# Patient Record
Sex: FEMALE | Race: BLACK OR AFRICAN AMERICAN | URBAN - METROPOLITAN AREA
[De-identification: names, ages, dates, MRNs, and addresses within clinical notes are randomized per-mention and may not be internally consistent; named-entity substitution may affect disease eponyms.]

---

## 2023-03-08 PROBLEM — E55.9 VITAMIN D DEFICIENCY: Status: ACTIVE | Noted: 2023-03-08

## 2023-03-08 PROBLEM — E16.2 LOW BLOOD SUGAR: Status: ACTIVE | Noted: 2023-03-08

## 2023-03-08 PROBLEM — H25.13 AGE-RELATED NUCLEAR CATARACT OF BOTH EYES: Status: ACTIVE | Noted: 2023-03-08

## 2023-03-08 PROBLEM — M70.50 BURSITIS OF KNEE: Status: ACTIVE | Noted: 2023-03-08

## 2023-03-08 PROBLEM — H10.30 CONJUNCTIVITIS, ACUTE: Status: ACTIVE | Noted: 2023-03-08

## 2023-03-08 PROBLEM — E66.811 CLASS 1 OBESITY WITH BODY MASS INDEX (BMI) OF 32.0 TO 32.9 IN ADULT: Status: ACTIVE | Noted: 2023-03-08

## 2023-03-08 PROBLEM — E78.5 HYPERLIPIDEMIA: Status: ACTIVE | Noted: 2023-03-08

## 2023-03-08 PROBLEM — R53.83 FATIGUE: Status: ACTIVE | Noted: 2023-03-08

## 2023-03-08 PROBLEM — R25.2 LEG CRAMPS: Status: ACTIVE | Noted: 2023-03-08

## 2023-03-08 PROBLEM — H52.13 BILATERAL MYOPIA: Status: ACTIVE | Noted: 2023-03-08

## 2023-03-08 PROBLEM — G25.3 MYOCLONUS: Status: ACTIVE | Noted: 2023-03-08

## 2023-03-08 PROBLEM — T36.95XA ANTIBIOTIC-INDUCED YEAST INFECTION: Status: ACTIVE | Noted: 2023-03-08

## 2023-03-08 PROBLEM — H52.203 ASTIGMATISM OF BOTH EYES: Status: ACTIVE | Noted: 2023-03-08

## 2023-03-08 PROBLEM — B37.9 ANTIBIOTIC-INDUCED YEAST INFECTION: Status: ACTIVE | Noted: 2023-03-08

## 2023-03-08 PROBLEM — G47.00 INSOMNIA: Status: ACTIVE | Noted: 2023-03-08

## 2023-03-08 PROBLEM — Z98.84 BARIATRIC SURGERY STATUS: Status: ACTIVE | Noted: 2023-03-08

## 2023-03-08 PROBLEM — E66.9 CLASS 1 OBESITY WITH BODY MASS INDEX (BMI) OF 32.0 TO 32.9 IN ADULT: Status: ACTIVE | Noted: 2023-03-08

## 2023-03-08 PROBLEM — F41.9 ANXIETY: Status: ACTIVE | Noted: 2023-03-08

## 2023-03-08 PROBLEM — G47.33 OBSTRUCTIVE SLEEP APNEA OF ADULT: Status: ACTIVE | Noted: 2023-03-08

## 2023-03-08 PROBLEM — D64.9 ANEMIA: Status: ACTIVE | Noted: 2023-03-08

## 2023-03-08 PROBLEM — E66.9 OBESITY (BMI 35.0-39.9 WITHOUT COMORBIDITY): Status: ACTIVE | Noted: 2023-03-08

## 2023-03-08 RX ORDER — ARIPIPRAZOLE 10 MG/1
10 TABLET ORAL DAILY
COMMUNITY

## 2023-03-08 RX ORDER — ATORVASTATIN CALCIUM 10 MG/1
1 TABLET, FILM COATED ORAL DAILY
COMMUNITY
Start: 2021-03-17 | End: 2023-03-15 | Stop reason: SDUPTHER

## 2023-03-08 RX ORDER — BENZTROPINE MESYLATE 1 MG/1
1 TABLET ORAL DAILY
COMMUNITY

## 2023-03-08 RX ORDER — ALPRAZOLAM 0.5 MG/1
0.5 TABLET ORAL DAILY PRN
COMMUNITY

## 2023-03-15 ENCOUNTER — OFFICE VISIT (OUTPATIENT)
Dept: PRIMARY CARE | Facility: CLINIC | Age: 48
End: 2023-03-15
Payer: MEDICAID

## 2023-03-15 ENCOUNTER — LAB (OUTPATIENT)
Dept: LAB | Facility: LAB | Age: 48
End: 2023-03-15
Payer: MEDICAID

## 2023-03-15 VITALS
WEIGHT: 171 LBS | DIASTOLIC BLOOD PRESSURE: 58 MMHG | BODY MASS INDEX: 32.28 KG/M2 | SYSTOLIC BLOOD PRESSURE: 104 MMHG | HEIGHT: 61 IN

## 2023-03-15 DIAGNOSIS — E66.9 OBESITY (BMI 35.0-39.9 WITHOUT COMORBIDITY): ICD-10-CM

## 2023-03-15 DIAGNOSIS — Z00.00 PREVENTATIVE HEALTH CARE: ICD-10-CM

## 2023-03-15 DIAGNOSIS — Z98.84 BARIATRIC SURGERY STATUS: ICD-10-CM

## 2023-03-15 DIAGNOSIS — E78.2 MIXED HYPERLIPIDEMIA: ICD-10-CM

## 2023-03-15 DIAGNOSIS — Z71.3 ENCOUNTER FOR WEIGHT LOSS COUNSELING: ICD-10-CM

## 2023-03-15 DIAGNOSIS — Z00.00 PREVENTATIVE HEALTH CARE: Primary | ICD-10-CM

## 2023-03-15 LAB
ALANINE AMINOTRANSFERASE (SGPT) (U/L) IN SER/PLAS: 22 U/L (ref 7–45)
ALBUMIN (G/DL) IN SER/PLAS: 4.2 G/DL (ref 3.4–5)
ALKALINE PHOSPHATASE (U/L) IN SER/PLAS: 64 U/L (ref 33–110)
ANION GAP IN SER/PLAS: 12 MMOL/L (ref 10–20)
ASPARTATE AMINOTRANSFERASE (SGOT) (U/L) IN SER/PLAS: 19 U/L (ref 9–39)
BILIRUBIN TOTAL (MG/DL) IN SER/PLAS: 0.6 MG/DL (ref 0–1.2)
CALCIUM (MG/DL) IN SER/PLAS: 9.2 MG/DL (ref 8.6–10.6)
CARBON DIOXIDE, TOTAL (MMOL/L) IN SER/PLAS: 29 MMOL/L (ref 21–32)
CHLORIDE (MMOL/L) IN SER/PLAS: 103 MMOL/L (ref 98–107)
CHOLESTEROL (MG/DL) IN SER/PLAS: 162 MG/DL (ref 0–199)
CHOLESTEROL IN HDL (MG/DL) IN SER/PLAS: 56 MG/DL
CHOLESTEROL/HDL RATIO: 2.9
COBALAMIN (VITAMIN B12) (PG/ML) IN SER/PLAS: 467 PG/ML (ref 211–911)
CREATININE (MG/DL) IN SER/PLAS: 0.74 MG/DL (ref 0.5–1.05)
ERYTHROCYTE DISTRIBUTION WIDTH (RATIO) BY AUTOMATED COUNT: 11.9 % (ref 11.5–14.5)
ERYTHROCYTE MEAN CORPUSCULAR HEMOGLOBIN CONCENTRATION (G/DL) BY AUTOMATED: 33.5 G/DL (ref 32–36)
ERYTHROCYTE MEAN CORPUSCULAR VOLUME (FL) BY AUTOMATED COUNT: 102 FL (ref 80–100)
ERYTHROCYTES (10*6/UL) IN BLOOD BY AUTOMATED COUNT: 3.69 X10E12/L (ref 4–5.2)
GFR FEMALE: >90 ML/MIN/1.73M2
GLUCOSE (MG/DL) IN SER/PLAS: 88 MG/DL (ref 74–99)
HEMATOCRIT (%) IN BLOOD BY AUTOMATED COUNT: 37.6 % (ref 36–46)
HEMOGLOBIN (G/DL) IN BLOOD: 12.6 G/DL (ref 12–16)
LDL: 83 MG/DL (ref 0–99)
LEUKOCYTES (10*3/UL) IN BLOOD BY AUTOMATED COUNT: 5 X10E9/L (ref 4.4–11.3)
NRBC (PER 100 WBCS) BY AUTOMATED COUNT: 0 /100 WBC (ref 0–0)
PLATELETS (10*3/UL) IN BLOOD AUTOMATED COUNT: 222 X10E9/L (ref 150–450)
POTASSIUM (MMOL/L) IN SER/PLAS: 4.4 MMOL/L (ref 3.5–5.3)
PROTEIN TOTAL: 7.2 G/DL (ref 6.4–8.2)
SODIUM (MMOL/L) IN SER/PLAS: 140 MMOL/L (ref 136–145)
THYROTROPIN (MIU/L) IN SER/PLAS BY DETECTION LIMIT <= 0.05 MIU/L: 0.52 MIU/L (ref 0.44–3.98)
TRIGLYCERIDE (MG/DL) IN SER/PLAS: 113 MG/DL (ref 0–149)
UREA NITROGEN (MG/DL) IN SER/PLAS: 9 MG/DL (ref 6–23)
VLDL: 23 MG/DL (ref 0–40)

## 2023-03-15 PROCEDURE — 84443 ASSAY THYROID STIM HORMONE: CPT

## 2023-03-15 PROCEDURE — 80053 COMPREHEN METABOLIC PANEL: CPT

## 2023-03-15 PROCEDURE — 99396 PREV VISIT EST AGE 40-64: CPT | Performed by: INTERNAL MEDICINE

## 2023-03-15 PROCEDURE — 82652 VIT D 1 25-DIHYDROXY: CPT

## 2023-03-15 PROCEDURE — 85027 COMPLETE CBC AUTOMATED: CPT

## 2023-03-15 PROCEDURE — 82607 VITAMIN B-12: CPT

## 2023-03-15 PROCEDURE — 80061 LIPID PANEL: CPT

## 2023-03-15 PROCEDURE — 36415 COLL VENOUS BLD VENIPUNCTURE: CPT

## 2023-03-15 RX ORDER — ATORVASTATIN CALCIUM 10 MG/1
10 TABLET, FILM COATED ORAL DAILY
Qty: 90 TABLET | Refills: 1 | Status: SHIPPED | OUTPATIENT
Start: 2023-03-15

## 2023-03-15 RX ORDER — KETOCONAZOLE 20 MG/ML
SHAMPOO, SUSPENSION TOPICAL
COMMUNITY
Start: 2023-01-27

## 2023-03-15 RX ORDER — HYDROXYZINE PAMOATE 25 MG/1
CAPSULE ORAL
COMMUNITY
Start: 2023-02-28

## 2023-03-15 RX ORDER — ESCITALOPRAM OXALATE 10 MG/1
1 TABLET ORAL DAILY
COMMUNITY
Start: 2023-02-27

## 2023-03-15 NOTE — PROGRESS NOTES
Subjective   Patient ID: Renetta Coates is a 47 y.o. female who presents for Follow-up and Med Refill.    HPI   Patient is here for physical  High cholesterol needs medication refill  Anxiety is doing fine   patient wants some diet will to help control her weight he had gastric bypass surgery done  She has not done her routine blood for a while    Past recap  Patient is here for follow-up  Follow-up on blood work  Still feels very tired  Has IUD so not sure if she is going through menopause      patient had bariatric surgery done April 26  She has lost from 197 pounds to 179 pounds  She was doing great but recently she is feeling very fatigued  She is denying any abdominal pain  Needs refills on medication for cholesterol        Patient presents with complaints of having both knee pain  Right knee hurts for a while but now left knee started hurting.  She started hurting after recently going for exercise classes where there was lot of bending and moving  Follow-up on blood work  Having problems with insomnia trazodone is not helping  She does eat a lot of fast food     past recap  Patient presents with complaints of having jerking movements  Over the body jerks leg jerks arms legs head jerks  She is concerned she is having seizure  She had these movements going on off and on but more pronounced recently. No correlation with any activity  She is on Paxil for more than 20 years and takes hydroxyzine for anxiety  She is going for stress test through cardiology again  Legs also cramping at night     She is going to the gastric bypass program and working with the dietitian to lose weight        To establish PCP  Moved from [place] to [place]  She is on disability for anxiety and bipolar  She is going to establish with a new psychiatrist  She wants to go for gastric bypass surgery because she stated else okay able to lose her weight     Social history: Non-smoker no drugs weekend cocktail, 3 kids youngest 8 years  "old  Past medical history: Obstructive sleep apnea, anxiety, bipolar, cholecystectomy  Occupation: Retired social service on disability   Review of Systems    Objective   /58   Ht 1.549 m (5' 1\")   Wt 77.6 kg (171 lb)   BMI 32.31 kg/m²     Physical Exam  Vitals reviewed.   Constitutional:       Appearance: Normal appearance.   HENT:      Head: Normocephalic and atraumatic.      Right Ear: Tympanic membrane, ear canal and external ear normal.      Left Ear: Tympanic membrane, ear canal and external ear normal.      Nose: Nose normal.      Mouth/Throat:      Pharynx: Oropharynx is clear.   Eyes:      Extraocular Movements: Extraocular movements intact.      Conjunctiva/sclera: Conjunctivae normal.      Pupils: Pupils are equal, round, and reactive to light.   Cardiovascular:      Rate and Rhythm: Normal rate and regular rhythm.      Pulses: Normal pulses.      Heart sounds: Normal heart sounds.   Pulmonary:      Effort: Pulmonary effort is normal.      Breath sounds: Normal breath sounds.   Abdominal:      General: Abdomen is flat. Bowel sounds are normal.      Palpations: Abdomen is soft.   Musculoskeletal:      Cervical back: Normal range of motion and neck supple.   Skin:     General: Skin is warm and dry.   Neurological:      General: No focal deficit present.      Mental Status: She is alert and oriented to person, place, and time.   Psychiatric:         Mood and Affect: Mood normal.         Assessment/Plan   Problem List Items Addressed This Visit          Digestive    Bariatric surgery status    Relevant Orders    TSH with reflex to Free T4 if abnormal (Completed)    Vitamin D 1,25 Dihydroxy    Vitamin B12 (Completed)       Endocrine/Metabolic    Obesity (BMI 35.0-39.9 without comorbidity)    Relevant Medications    semaglutide (Ozempic) 0.25 mg or 0.5 mg(2 mg/1.5 mL) pen injector    Other Relevant Orders    Referral to Comprehensive Weight Loss    Inpatient consult to Dietary       Other    " Hyperlipidemia    Relevant Medications    atorvastatin (Lipitor) 10 mg tablet    Preventative health care - Primary    Relevant Orders    CBC (Completed)    Comprehensive Metabolic Panel (Completed)    Lipid Panel (Completed)    TSH with reflex to Free T4 if abnormal (Completed)    Vitamin D 1,25 Dihydroxy    Vitamin B12 (Completed)     Other Visit Diagnoses       Encounter for weight loss counseling        Relevant Orders    Referral to Comprehensive Weight Loss    Inpatient consult to Dietary                  11/18  Last blood work showed elevated blood sugar  Advised patient to check blood work to rule out any electrolyte imbalance causing the jerking  Other possibility could be clonus activity related to SSRI use for long-term  Check blood work and refer patient to neurology  Blood work ordered for cholesterol and high sugar  Discussed sleep hygiene  Advised to try melatonin if trazodone is not working  Again discussed diet exercise losing weight  Follow-up after blood work         3/15/23  Physical  Routine blood work ordered encourage patient to go for weight loss clinic  Discussed diet and exercise and she has achieved her goal weight she is still insistent on taking Ozempic  Prescription for Ozempic given  Refill given on Lipitor

## 2023-03-18 LAB — VITAMIN D 1,25-DIHYDROXY: 79 PG/ML (ref 19.9–79.3)

## 2023-04-03 ENCOUNTER — TELEPHONE (OUTPATIENT)
Dept: PRIMARY CARE | Facility: CLINIC | Age: 48
End: 2023-04-03
Payer: MEDICARE

## 2023-04-05 ENCOUNTER — TELEPHONE (OUTPATIENT)
Dept: PRIMARY CARE | Facility: CLINIC | Age: 48
End: 2023-04-05
Payer: MEDICARE

## 2023-04-06 ENCOUNTER — TELEPHONE (OUTPATIENT)
Dept: PRIMARY CARE | Facility: CLINIC | Age: 48
End: 2023-04-06
Payer: MEDICARE

## 2023-04-12 ENCOUNTER — OFFICE VISIT (OUTPATIENT)
Dept: PRIMARY CARE | Facility: CLINIC | Age: 48
End: 2023-04-12
Payer: MEDICARE

## 2023-04-12 VITALS
HEIGHT: 61 IN | SYSTOLIC BLOOD PRESSURE: 104 MMHG | BODY MASS INDEX: 31.15 KG/M2 | DIASTOLIC BLOOD PRESSURE: 58 MMHG | WEIGHT: 165 LBS

## 2023-04-12 DIAGNOSIS — E66.9 OBESITY (BMI 35.0-39.9 WITHOUT COMORBIDITY): ICD-10-CM

## 2023-04-12 PROCEDURE — 99213 OFFICE O/P EST LOW 20 MIN: CPT | Performed by: INTERNAL MEDICINE

## 2023-04-12 RX ORDER — LAMOTRIGINE 100 MG/1
100 TABLET ORAL
COMMUNITY
Start: 2023-04-11

## 2023-04-12 NOTE — PROGRESS NOTES
Subjective   Patient ID: eRnetta Coates is a 47 y.o. female who presents for Follow-up (results).    HPI   Patient is here for follow-up on Ozempic  She has lost 5/ 6 pounds on Ozempic  She is tolerating well  She is interested in increasing the dose  Yesterday she was started on Lamictal by the psychiatrist    PAST RECAP   patient is here for physical  High cholesterol needs medication refill  Anxiety is doing fine   patient wants some diet will to help control her weight he had gastric bypass surgery done  She has not done her routine blood for a while     Past recap  Patient is here for follow-up  Follow-up on blood work  Still feels very tired  Has IUD so not sure if she is going through menopause      patient had bariatric surgery done April 26  She has lost from 197 pounds to 179 pounds  She was doing great but recently she is feeling very fatigued  She is denying any abdominal pain  Needs refills on medication for cholesterol        Patient presents with complaints of having both knee pain  Right knee hurts for a while but now left knee started hurting.  She started hurting after recently going for exercise classes where there was lot of bending and moving  Follow-up on blood work  Having problems with insomnia trazodone is not helping  She does eat a lot of fast food     past recap  Patient presents with complaints of having jerking movements  Over the body jerks leg jerks arms legs head jerks  She is concerned she is having seizure  She had these movements going on off and on but more pronounced recently. No correlation with any activity  She is on Paxil for more than 20 years and takes hydroxyzine for anxiety  She is going for stress test through cardiology again  Legs also cramping at night     She is going to the gastric bypass program and working with the dietitian to lose weight        To establish PCP  Moved from [place] to [place]  She is on disability for anxiety and bipolar  She is going to  "establish with a new psychiatrist  She wants to go for gastric bypass surgery because she stated else okay able to lose her weight     Social history: Non-smoker no drugs weekend cocktail, 3 kids youngest 8 years old  Past medical history: Obstructive sleep apnea, anxiety, bipolar, cholecystectomy  Occupation: Retired social service on disability   Review of Systems    Objective   /58   Ht 1.549 m (5' 1\")   Wt 72.6 kg (160 lb)   BMI 30.23 kg/m²     Physical Exam  Vitals reviewed.   Constitutional:       Appearance: Normal appearance.   HENT:      Head: Normocephalic and atraumatic.      Right Ear: Tympanic membrane, ear canal and external ear normal.      Left Ear: Tympanic membrane, ear canal and external ear normal.      Nose: Nose normal.      Mouth/Throat:      Pharynx: Oropharynx is clear.   Eyes:      Extraocular Movements: Extraocular movements intact.      Conjunctiva/sclera: Conjunctivae normal.      Pupils: Pupils are equal, round, and reactive to light.   Cardiovascular:      Rate and Rhythm: Normal rate and regular rhythm.      Pulses: Normal pulses.      Heart sounds: Normal heart sounds.   Pulmonary:      Effort: Pulmonary effort is normal.      Breath sounds: Normal breath sounds.   Abdominal:      General: Abdomen is flat. Bowel sounds are normal.      Palpations: Abdomen is soft.   Musculoskeletal:      Cervical back: Normal range of motion and neck supple.   Skin:     General: Skin is warm and dry.   Neurological:      General: No focal deficit present.      Mental Status: She is alert and oriented to person, place, and time.   Psychiatric:         Mood and Affect: Mood normal.       Assessment/Plan        11/18  Last blood work showed elevated blood sugar  Advised patient to check blood work to rule out any electrolyte imbalance causing the jerking  Other possibility could be clonus activity related to SSRI use for long-term  Check blood work and refer patient to neurology  Blood work " ordered for cholesterol and high sugar  Discussed sleep hygiene  Advised to try melatonin if trazodone is not working  Again discussed diet exercise losing weight  Follow-up after blood work           3/15/23  Physical  Routine blood work ordered encourage patient to go for weight loss clinic  Discussed diet and exercise and she has achieved her goal weight she is still insistent on taking Ozempic  Prescription for Ozempic given  Refill given on Lipitor    4/12/23  Will increase dose of Ozempic  Advised patient to follow-up in 1 month for GYN exam  Discussed diet and exercise  Follow-up in a month

## 2023-04-25 LAB
ALANINE AMINOTRANSFERASE (SGPT) (U/L) IN SER/PLAS: 18 U/L (ref 7–45)
ALBUMIN (G/DL) IN SER/PLAS: 4.2 G/DL (ref 3.4–5)
ALKALINE PHOSPHATASE (U/L) IN SER/PLAS: 57 U/L (ref 33–110)
ANION GAP IN SER/PLAS: 10 MMOL/L (ref 10–20)
ASPARTATE AMINOTRANSFERASE (SGOT) (U/L) IN SER/PLAS: 15 U/L (ref 9–39)
BASOPHILS (10*3/UL) IN BLOOD BY AUTOMATED COUNT: 0.03 X10E9/L (ref 0–0.1)
BASOPHILS/100 LEUKOCYTES IN BLOOD BY AUTOMATED COUNT: 1 % (ref 0–2)
BILIRUBIN TOTAL (MG/DL) IN SER/PLAS: 0.3 MG/DL (ref 0–1.2)
CALCIDIOL (25 OH VITAMIN D3) (NG/ML) IN SER/PLAS: 21 NG/ML
CALCIUM (MG/DL) IN SER/PLAS: 8.8 MG/DL (ref 8.6–10.6)
CARBON DIOXIDE, TOTAL (MMOL/L) IN SER/PLAS: 31 MMOL/L (ref 21–32)
CHLORIDE (MMOL/L) IN SER/PLAS: 103 MMOL/L (ref 98–107)
CHOLESTEROL (MG/DL) IN SER/PLAS: 129 MG/DL (ref 0–199)
CHOLESTEROL IN HDL (MG/DL) IN SER/PLAS: 53 MG/DL
CHOLESTEROL/HDL RATIO: 2.4
COBALAMIN (VITAMIN B12) (PG/ML) IN SER/PLAS: 394 PG/ML (ref 211–911)
CREATININE (MG/DL) IN SER/PLAS: 0.93 MG/DL (ref 0.5–1.05)
EOSINOPHILS (10*3/UL) IN BLOOD BY AUTOMATED COUNT: 0.06 X10E9/L (ref 0–0.7)
EOSINOPHILS/100 LEUKOCYTES IN BLOOD BY AUTOMATED COUNT: 2.1 % (ref 0–6)
ERYTHROCYTE DISTRIBUTION WIDTH (RATIO) BY AUTOMATED COUNT: 11.6 % (ref 11.5–14.5)
ERYTHROCYTE MEAN CORPUSCULAR HEMOGLOBIN CONCENTRATION (G/DL) BY AUTOMATED: 33.5 G/DL (ref 32–36)
ERYTHROCYTE MEAN CORPUSCULAR VOLUME (FL) BY AUTOMATED COUNT: 101 FL (ref 80–100)
ERYTHROCYTES (10*6/UL) IN BLOOD BY AUTOMATED COUNT: 3.52 X10E12/L (ref 4–5.2)
ESTIMATED AVERAGE GLUCOSE FOR HBA1C: 108 MG/DL
GFR FEMALE: 75 ML/MIN/1.73M2
GLUCOSE (MG/DL) IN SER/PLAS: 83 MG/DL (ref 74–99)
HEMATOCRIT (%) IN BLOOD BY AUTOMATED COUNT: 35.5 % (ref 36–46)
HEMOGLOBIN (G/DL) IN BLOOD: 11.9 G/DL (ref 12–16)
HEMOGLOBIN A1C/HEMOGLOBIN TOTAL IN BLOOD: 5.4 %
IMMATURE GRANULOCYTES/100 LEUKOCYTES IN BLOOD BY AUTOMATED COUNT: 0.3 % (ref 0–0.9)
IRON (UG/DL) IN SER/PLAS: 74 UG/DL (ref 35–150)
IRON BINDING CAPACITY (UG/DL) IN SER/PLAS: 317 UG/DL (ref 240–445)
IRON SATURATION (%) IN SER/PLAS: 23 % (ref 25–45)
LDL: 63 MG/DL (ref 0–99)
LEUKOCYTES (10*3/UL) IN BLOOD BY AUTOMATED COUNT: 2.9 X10E9/L (ref 4.4–11.3)
LYMPHOCYTES (10*3/UL) IN BLOOD BY AUTOMATED COUNT: 1.33 X10E9/L (ref 1.2–4.8)
LYMPHOCYTES/100 LEUKOCYTES IN BLOOD BY AUTOMATED COUNT: 46.3 % (ref 13–44)
MONOCYTES (10*3/UL) IN BLOOD BY AUTOMATED COUNT: 0.31 X10E9/L (ref 0.1–1)
MONOCYTES/100 LEUKOCYTES IN BLOOD BY AUTOMATED COUNT: 10.8 % (ref 2–10)
NEUTROPHILS (10*3/UL) IN BLOOD BY AUTOMATED COUNT: 1.13 X10E9/L (ref 1.2–7.7)
NEUTROPHILS/100 LEUKOCYTES IN BLOOD BY AUTOMATED COUNT: 39.5 % (ref 40–80)
NRBC (PER 100 WBCS) BY AUTOMATED COUNT: 0 /100 WBC (ref 0–0)
PLATELETS (10*3/UL) IN BLOOD AUTOMATED COUNT: 178 X10E9/L (ref 150–450)
POTASSIUM (MMOL/L) IN SER/PLAS: 4.3 MMOL/L (ref 3.5–5.3)
PROTEIN TOTAL: 6.9 G/DL (ref 6.4–8.2)
SODIUM (MMOL/L) IN SER/PLAS: 140 MMOL/L (ref 136–145)
THYROTROPIN (MIU/L) IN SER/PLAS BY DETECTION LIMIT <= 0.05 MIU/L: 0.38 MIU/L (ref 0.44–3.98)
TRIGLYCERIDE (MG/DL) IN SER/PLAS: 67 MG/DL (ref 0–149)
UREA NITROGEN (MG/DL) IN SER/PLAS: 8 MG/DL (ref 6–23)
VLDL: 13 MG/DL (ref 0–40)

## 2023-05-01 LAB — VITAMIN B1, WHOLE BLOOD: 96 NMOL/L (ref 70–180)

## 2023-05-05 ENCOUNTER — APPOINTMENT (OUTPATIENT)
Dept: PRIMARY CARE | Facility: CLINIC | Age: 48
End: 2023-05-05
Payer: COMMERCIAL

## 2023-05-10 ENCOUNTER — OFFICE VISIT (OUTPATIENT)
Dept: PRIMARY CARE | Facility: CLINIC | Age: 48
End: 2023-05-10
Payer: COMMERCIAL

## 2023-05-10 VITALS
DIASTOLIC BLOOD PRESSURE: 72 MMHG | HEIGHT: 61 IN | WEIGHT: 163 LBS | BODY MASS INDEX: 30.78 KG/M2 | SYSTOLIC BLOOD PRESSURE: 110 MMHG

## 2023-05-10 DIAGNOSIS — Z12.31 SCREENING MAMMOGRAM, ENCOUNTER FOR: ICD-10-CM

## 2023-05-10 DIAGNOSIS — Z01.419 ENCOUNTER FOR CERVICAL PAP SMEAR WITH PELVIC EXAM: ICD-10-CM

## 2023-05-10 DIAGNOSIS — Z12.11 ENCOUNTER FOR SCREENING COLONOSCOPY: ICD-10-CM

## 2023-05-10 PROBLEM — F43.10 POST TRAUMATIC STRESS DISORDER: Status: ACTIVE | Noted: 2021-12-09

## 2023-05-10 PROBLEM — H25.10 NUCLEAR SENILE CATARACT: Status: ACTIVE | Noted: 2023-01-09

## 2023-05-10 PROBLEM — M72.2 PLANTAR FASCIAL FIBROMATOSIS: Status: ACTIVE | Noted: 2023-01-09

## 2023-05-10 PROBLEM — F31.70: Chronic | Status: ACTIVE | Noted: 2022-04-14

## 2023-05-10 PROBLEM — F50.2 BULIMIA NERVOSA (MULTI): Status: ACTIVE | Noted: 2023-01-09

## 2023-05-10 PROBLEM — F39 MOOD DISORDER (CMS-HCC): Status: ACTIVE | Noted: 2021-12-08

## 2023-05-10 PROBLEM — L29.9 ITCHING: Status: ACTIVE | Noted: 2020-12-21

## 2023-05-10 PROBLEM — F51.05 INSOMNIA DUE TO OTHER MENTAL DISORDER: Status: ACTIVE | Noted: 2022-01-25

## 2023-05-10 PROBLEM — F50.20 BULIMIA NERVOSA: Status: ACTIVE | Noted: 2023-01-09

## 2023-05-10 PROBLEM — Z86.59 HISTORY OF ATTENTION DEFICIT HYPERACTIVITY DISORDER (ADHD): Status: ACTIVE | Noted: 2022-01-25

## 2023-05-10 PROBLEM — F99 INSOMNIA DUE TO OTHER MENTAL DISORDER: Status: ACTIVE | Noted: 2022-01-25

## 2023-05-10 PROCEDURE — 88142 CYTOPATH C/V THIN LAYER: CPT

## 2023-05-10 PROCEDURE — 82270 OCCULT BLOOD FECES: CPT | Performed by: INTERNAL MEDICINE

## 2023-05-10 PROCEDURE — G0101 CA SCREEN;PELVIC/BREAST EXAM: HCPCS | Performed by: INTERNAL MEDICINE

## 2023-05-10 PROCEDURE — 87624 HPV HI-RISK TYP POOLED RSLT: CPT

## 2023-05-10 PROCEDURE — 99213 OFFICE O/P EST LOW 20 MIN: CPT | Performed by: INTERNAL MEDICINE

## 2023-05-10 ASSESSMENT — PAIN SCALES - GENERAL: PAINLEVEL: 0-NO PAIN

## 2023-05-11 DIAGNOSIS — R92.8 OTHER ABNORMAL AND INCONCLUSIVE FINDINGS ON DIAGNOSTIC IMAGING OF BREAST: ICD-10-CM

## 2023-05-11 DIAGNOSIS — N63.20 MASS OF LEFT BREAST, UNSPECIFIED QUADRANT: ICD-10-CM

## 2023-05-14 NOTE — PROGRESS NOTES
Subjective   Renetta Coates is a 47 y.o. female here for a routine exam. Current complaints: For GYN exam. Personal health questionnaire reviewed: yes.    Gynecologic Exam         Visit Vitals  /72 (BP Location: Left arm, Patient Position: Sitting, BP Cuff Size: Adult)        Review of Systems     Gynecologic History  Patient's last menstrual period was 04/10/2023 (approximate).  Contraception: none  Last Pap: 3 years ago. Results were: normal  Last mammogram: 2 years. Results were: normal    Obstetric History  G 5    P 3        Objective   Physical Exam  Chest:      Chest wall: No swelling or tenderness.   Breasts:     Right: Normal. No swelling, mass, nipple discharge, skin change or tenderness.      Left: Normal. No swelling, mass, nipple discharge, skin change or tenderness.   Abdominal:      Hernia: There is no hernia in the left inguinal area or right inguinal area.   Genitourinary:     Pubic Area: No rash.       Labia:         Right: No rash, tenderness or lesion.         Left: No rash, tenderness or lesion.       Urethra: No urethral pain, urethral swelling or urethral lesion.      Vagina: Normal.      Cervix: Normal.      Uterus: Normal.       Adnexa: Right adnexa normal and left adnexa normal.      Rectum: Normal.   Lymphadenopathy:      Upper Body:      Right upper body: No axillary adenopathy.      Left upper body: No axillary adenopathy.      Lower Body: No right inguinal adenopathy. No left inguinal adenopathy.   Skin:     General: Skin is warm and dry.      Findings: No lesion or rash.     Patient has some thickening of the left lower quadrant breast      Assessment/Plan   Problem List Items Addressed This Visit    None  Visit Diagnoses       Encounter for cervical Pap smear with pelvic exam        Relevant Orders    THINPREP PAP TEST    Encounter for screening colonoscopy        Relevant Orders    Colonoscopy    Screening mammogram, encounter for              GYN exam done  Patient has some  thickening of the left breast tissue   Pap done  Hemoccult stool negative  Mammogram ordered  Colonoscopy ordered  Patient does have some vaginal discharge will monitor for the results

## 2023-05-16 DIAGNOSIS — E66.9 OBESITY (BMI 35.0-39.9 WITHOUT COMORBIDITY): ICD-10-CM

## 2023-05-17 LAB
COMPLETE PATHOLOGY REPORT: NORMAL
CONVERTED CLINICAL DIAGNOSIS-HISTORY: NORMAL
CONVERTED DIAGNOSIS COMMENT: NORMAL
CONVERTED FINAL DIAGNOSIS: NORMAL
CONVERTED FINAL REPORT PDF LINK TO COPY AND PASTE: NORMAL

## 2023-05-25 ENCOUNTER — DOCUMENTATION (OUTPATIENT)
Dept: PRIMARY CARE | Facility: CLINIC | Age: 48
End: 2023-05-25
Payer: COMMERCIAL

## 2023-05-25 NOTE — PROGRESS NOTES
Tried calling pt, pt's child answered the phone stated pt was sleeping and with have her call back

## 2023-06-01 ENCOUNTER — TELEPHONE (OUTPATIENT)
Dept: PRIMARY CARE | Facility: CLINIC | Age: 48
End: 2023-06-01
Payer: COMMERCIAL

## 2023-06-03 ENCOUNTER — LAB (OUTPATIENT)
Dept: LAB | Facility: LAB | Age: 48
End: 2023-06-03
Payer: COMMERCIAL

## 2023-06-03 ENCOUNTER — OFFICE VISIT (OUTPATIENT)
Dept: PRIMARY CARE | Facility: CLINIC | Age: 48
End: 2023-06-03
Payer: COMMERCIAL

## 2023-06-03 VITALS — WEIGHT: 161 LBS | HEIGHT: 61 IN | BODY MASS INDEX: 30.4 KG/M2

## 2023-06-03 DIAGNOSIS — N89.8 VAGINAL DISCHARGE: ICD-10-CM

## 2023-06-03 DIAGNOSIS — Z20.2 POSSIBLE EXPOSURE TO STD: ICD-10-CM

## 2023-06-03 PROBLEM — M25.562 ACUTE PAIN OF LEFT KNEE: Status: ACTIVE | Noted: 2023-06-03

## 2023-06-03 PROBLEM — G25.5 MUSCLE, JERKY MOVEMENTS (UNCONTROLLED): Status: ACTIVE | Noted: 2023-06-03

## 2023-06-03 PROBLEM — H66.93 ACUTE BILATERAL OTITIS MEDIA: Status: ACTIVE | Noted: 2023-06-03

## 2023-06-03 PROBLEM — F44.4 FUNCTIONAL NEUROLOGICAL SYMPTOM DISORDER WITH ABNORMAL MOVEMENT: Status: ACTIVE | Noted: 2023-06-03

## 2023-06-03 LAB
CLUE CELLS WET PREP: PRESENT
TRICH WET PREP: ABNORMAL
WBC WET PREP: ABNORMAL /HPF
YEAST PRESENCE WET PREP: ABNORMAL

## 2023-06-03 PROCEDURE — 1036F TOBACCO NON-USER: CPT | Performed by: INTERNAL MEDICINE

## 2023-06-03 PROCEDURE — 87591 N.GONORRHOEAE DNA AMP PROB: CPT

## 2023-06-03 PROCEDURE — 99213 OFFICE O/P EST LOW 20 MIN: CPT | Performed by: INTERNAL MEDICINE

## 2023-06-03 PROCEDURE — 87491 CHLMYD TRACH DNA AMP PROBE: CPT

## 2023-06-03 PROCEDURE — 87210 SMEAR WET MOUNT SALINE/INK: CPT

## 2023-06-03 NOTE — PROGRESS NOTES
Subjective   Patient ID: Renetta Andrea is a 47 y.o. female who presents for Vaginal Itching.    Vaginal Itching    Patient is here with complaints of having vaginal discharge itching for past 1 week    Patient is here for follow-up on Ozempic  She has lost 5/ 6 pounds on Ozempic  She is tolerating well  She is interested in increasing the dose  Yesterday she was started on Lamictal by the psychiatrist     PAST RECAP   patient is here for physical  High cholesterol needs medication refill  Anxiety is doing fine   patient wants some diet will to help control her weight he had gastric bypass surgery done  She has not done her routine blood for a while     Past recap  Patient is here for follow-up  Follow-up on blood work  Still feels very tired  Has IUD so not sure if she is going through menopause      patient had bariatric surgery done April 26  She has lost from 197 pounds to 179 pounds  She was doing great but recently she is feeling very fatigued  She is denying any abdominal pain  Needs refills on medication for cholesterol        Patient presents with complaints of having both knee pain  Right knee hurts for a while but now left knee started hurting.  She started hurting after recently going for exercise classes where there was lot of bending and moving  Follow-up on blood work  Having problems with insomnia trazodone is not helping  She does eat a lot of fast food     past recap  Patient presents with complaints of having jerking movements  Over the body jerks leg jerks arms legs head jerks  She is concerned she is having seizure  She had these movements going on off and on but more pronounced recently. No correlation with any activity  She is on Paxil for more than 20 years and takes hydroxyzine for anxiety  She is going for stress test through cardiology again  Legs also cramping at night     She is going to the gastric bypass program and working with the dietitian to lose weight        To establish  "PCP  Moved from [place] to [place]  She is on disability for anxiety and bipolar  She is going to establish with a new psychiatrist  She wants to go for gastric bypass surgery because she stated else okay able to lose her weight     Social history: Non-smoker no drugs weekend cocktail, 3 kids youngest 8 years old  Past medical history: Obstructive sleep apnea, anxiety, bipolar, cholecystectomy  Occupation: Retired social service on disability   Review of Systems    Objective   Ht 1.549 m (5' 1\")   Wt 73 kg (161 lb)   LMP 04/10/2023 (Approximate) Comment: [facility]  BMI 30.42 kg/m²     Physical Exam  Vitals reviewed.   Constitutional:       Appearance: Normal appearance.   HENT:      Head: Normocephalic and atraumatic.      Right Ear: Tympanic membrane, ear canal and external ear normal.      Left Ear: Tympanic membrane, ear canal and external ear normal.      Nose: Nose normal.      Mouth/Throat:      Pharynx: Oropharynx is clear.   Eyes:      Extraocular Movements: Extraocular movements intact.      Conjunctiva/sclera: Conjunctivae normal.      Pupils: Pupils are equal, round, and reactive to light.   Cardiovascular:      Rate and Rhythm: Normal rate and regular rhythm.      Pulses: Normal pulses.      Heart sounds: Normal heart sounds.   Pulmonary:      Effort: Pulmonary effort is normal.      Breath sounds: Normal breath sounds.   Abdominal:      General: Abdomen is flat. Bowel sounds are normal.      Palpations: Abdomen is soft.   Musculoskeletal:      Cervical back: Normal range of motion and neck supple.   Skin:     General: Skin is warm and dry.   Neurological:      General: No focal deficit present.      Mental Status: She is alert and oriented to person, place, and time.   Psychiatric:         Mood and Affect: Mood normal.         Assessment/Plan   Problem List Items Addressed This Visit    None  Visit Diagnoses       Possible exposure to STD        Relevant Orders    C. trachomatis / N. gonorrhoeae, " DNA probe    Vaginal discharge        Relevant Orders    Wet Prep, Genital             11/18  Last blood work showed elevated blood sugar  Advised patient to check blood work to rule out any electrolyte imbalance causing the jerking  Other possibility could be clonus activity related to SSRI use for long-term  Check blood work and refer patient to neurology  Blood work ordered for cholesterol and high sugar  Discussed sleep hygiene  Advised to try melatonin if trazodone is not working  Again discussed diet exercise losing weight  Follow-up after blood work           3/15/23  Physical  Routine blood work ordered encourage patient to go for weight loss clinic  Discussed diet and exercise and she has achieved her goal weight she is still insistent on taking Ozempic  Prescription for Ozempic given  Refill given on Lipitor     4/12/23  Will increase dose of Ozempic  Advised patient to follow-up in 1 month for GYN exam  Discussed diet and exercise  Follow-up in a month     16 2023  KOH prep done  We will call with the results  Urine for GC and chlamydia ordered

## 2023-06-04 LAB
CHLAMYDIA TRACH., AMPLIFIED: NEGATIVE
N. GONORRHEA, AMPLIFIED: NEGATIVE

## 2023-06-07 DIAGNOSIS — N89.8 VAGINAL DISCHARGE: ICD-10-CM

## 2023-06-07 RX ORDER — FLUCONAZOLE 100 MG/1
100 TABLET ORAL DAILY
Qty: 1 TABLET | Refills: 1 | Status: SHIPPED | OUTPATIENT
Start: 2023-06-07 | End: 2023-06-08

## 2023-06-07 RX ORDER — METRONIDAZOLE 500 MG/1
500 TABLET ORAL 3 TIMES DAILY
Qty: 15 TABLET | Refills: 0 | Status: SHIPPED | OUTPATIENT
Start: 2023-06-07 | End: 2023-06-12

## 2023-06-21 DIAGNOSIS — E66.9 OBESITY (BMI 35.0-39.9 WITHOUT COMORBIDITY): ICD-10-CM

## 2023-07-11 DIAGNOSIS — E66.9 OBESITY (BMI 35.0-39.9 WITHOUT COMORBIDITY): ICD-10-CM

## 2024-02-21 ENCOUNTER — APPOINTMENT (OUTPATIENT)
Dept: DERMATOLOGY | Facility: CLINIC | Age: 49
End: 2024-02-21
Payer: COMMERCIAL

## 2024-03-12 ENCOUNTER — APPOINTMENT (OUTPATIENT)
Dept: PAIN MEDICINE | Facility: CLINIC | Age: 49
End: 2024-03-12
Payer: COMMERCIAL

## 2024-04-02 ENCOUNTER — OFFICE VISIT (OUTPATIENT)
Dept: PAIN MEDICINE | Facility: CLINIC | Age: 49
End: 2024-04-02
Payer: COMMERCIAL

## 2024-04-02 ENCOUNTER — APPOINTMENT (OUTPATIENT)
Dept: PRIMARY CARE | Facility: CLINIC | Age: 49
End: 2024-04-02
Payer: COMMERCIAL

## 2024-04-02 ENCOUNTER — LAB (OUTPATIENT)
Dept: LAB | Facility: LAB | Age: 49
End: 2024-04-02
Payer: COMMERCIAL

## 2024-04-02 ENCOUNTER — OFFICE VISIT (OUTPATIENT)
Dept: PRIMARY CARE | Facility: CLINIC | Age: 49
End: 2024-04-02
Payer: COMMERCIAL

## 2024-04-02 VITALS
HEART RATE: 69 BPM | WEIGHT: 154 LBS | RESPIRATION RATE: 16 BRPM | SYSTOLIC BLOOD PRESSURE: 113 MMHG | BODY MASS INDEX: 28.34 KG/M2 | HEIGHT: 62 IN | DIASTOLIC BLOOD PRESSURE: 79 MMHG

## 2024-04-02 VITALS
WEIGHT: 154 LBS | BODY MASS INDEX: 28.34 KG/M2 | HEIGHT: 62 IN | SYSTOLIC BLOOD PRESSURE: 112 MMHG | DIASTOLIC BLOOD PRESSURE: 80 MMHG

## 2024-04-02 DIAGNOSIS — M22.2X1 PATELLOFEMORAL SYNDROME OF RIGHT KNEE: Primary | ICD-10-CM

## 2024-04-02 DIAGNOSIS — T78.40XA ALLERGY, INITIAL ENCOUNTER: ICD-10-CM

## 2024-04-02 DIAGNOSIS — D72.818 OTHER DECREASED WHITE BLOOD CELL (WBC) COUNT: ICD-10-CM

## 2024-04-02 LAB
ERYTHROCYTE [DISTWIDTH] IN BLOOD BY AUTOMATED COUNT: 12.7 % (ref 11.5–14.5)
HCT VFR BLD AUTO: 38 % (ref 36–46)
HGB BLD-MCNC: 12.3 G/DL (ref 12–16)
MCH RBC QN AUTO: 34.2 PG (ref 26–34)
MCHC RBC AUTO-ENTMCNC: 32.4 G/DL (ref 32–36)
MCV RBC AUTO: 106 FL (ref 80–100)
NRBC BLD-RTO: 0 /100 WBCS (ref 0–0)
PLATELET # BLD AUTO: 214 X10*3/UL (ref 150–450)
RBC # BLD AUTO: 3.6 X10*6/UL (ref 4–5.2)
WBC # BLD AUTO: 3.8 X10*3/UL (ref 4.4–11.3)

## 2024-04-02 PROCEDURE — 99213 OFFICE O/P EST LOW 20 MIN: CPT | Performed by: INTERNAL MEDICINE

## 2024-04-02 PROCEDURE — 85027 COMPLETE CBC AUTOMATED: CPT

## 2024-04-02 PROCEDURE — 36415 COLL VENOUS BLD VENIPUNCTURE: CPT

## 2024-04-02 PROCEDURE — 86003 ALLG SPEC IGE CRUDE XTRC EA: CPT

## 2024-04-02 PROCEDURE — 99204 OFFICE O/P NEW MOD 45 MIN: CPT | Performed by: PHYSICAL MEDICINE & REHABILITATION

## 2024-04-02 PROCEDURE — 82785 ASSAY OF IGE: CPT

## 2024-04-02 RX ORDER — MELOXICAM 7.5 MG/1
7.5 TABLET ORAL DAILY
Qty: 30 TABLET | Refills: 2 | Status: SHIPPED | OUTPATIENT
Start: 2024-04-02 | End: 2024-07-01

## 2024-04-02 RX ORDER — FLUTICASONE PROPIONATE 50 MCG
1 SPRAY, SUSPENSION (ML) NASAL DAILY
Qty: 16 G | Refills: 0 | Status: SHIPPED | OUTPATIENT
Start: 2024-04-02 | End: 2025-04-02

## 2024-04-02 ASSESSMENT — PAIN SCALES - GENERAL: PAINLEVEL: 5

## 2024-04-02 NOTE — PROGRESS NOTES
Subjective   Patient ID: Renetta Andrea is a 48 y.o. female who presents for Knee Pain.  HPI    Very pleasant 48-year-old female with right-sided anterior knee pain with no inciting event for a number of years.  Pain is worse with activity especially going up and down stairs localized to the anterior aspect of her knee.  No weakness, no lower extremity numbness or tingling, no instability or mechanical symptoms.  No prior injury to her knee.  She has not really had any treatment as of yet.  Has not really taken any medications or anything.  Does occasionally note some swelling when it flares up.              Oswestry disability index score:18%    Monitoring and compliance:    ORT:    PDUQ:    Office Agreement:      Review of Systems   All other systems reviewed and are negative.       Current Outpatient Medications   Medication Instructions    ALPRAZolam (XANAX) 0.5 mg, oral, Daily PRN    ARIPiprazole (ABILIFY) 10 mg, oral, Daily    atorvastatin (LIPITOR) 10 mg, oral, Daily    benztropine (COGENTIN) 1 mg, oral, Daily    escitalopram (Lexapro) 10 mg tablet 1 tablet, oral, Daily    hydrOXYzine pamoate (Vistaril) 25 mg capsule take 1 capsule by mouth three times a day if needed for anxiety    ketoconazole (NIZOral) 2 % shampoo USE TO WASH SCALP 2-3 TIMES PER WEEK    lamoTRIgine (LAMICTAL) 100 mg, oral, Daily RT    semaglutide (OZEMPIC) 1 mg, subcutaneous, Weekly        No past medical history on file.     No past surgical history on file.     Family History   Problem Relation Name Age of Onset    Diabetes Mother      Hypertension Mother          No Known Allergies     Objective     Vitals:    04/02/24 0942   BP: 113/79   Pulse: 69   Resp: 16        Physical Exam    GENERAL EXAM  Vital Signs: Vital signs to include heart rate, respiration rate, blood pressure, and temperature were reviewed.  General Appearance:  Awake, alert, healthy appearing, well developed, No acute distress.  Head: Normocephalic without  evidence of head injury.  Neck: The appearance of the neck was normal without swelling with a midline trachea.  Eyes: The eyelids and eyebrows exhibited no abnormalities.  Pupils were not pin-point.  Sclera was without icterus.  Lungs: Respiration rhythm and depth was normal.  Respiratory movements were normal without labored breathing.  Cardiovascular: No peripheral edema was present.    Neurological: Patient was oriented to time, place, and person.  Speech was normal.  Balance, gait, and stance were unremarkable.    Psychiatric: Appearance was normal with appropriate dress.  Mood was euthymic and affect was normal.  Skin: Affected regions were without ecchymosis or skin lesions.    Knee (MSK/neuro/skin):  Full active and passive range of motion in all planes  Strength 5 out of 5 bilateral lower extremities  Sensation to light-touch grossly intact bilateral lower extremities  Deep tendon reflexes equal bilateral lower extremities  No edema/effusion/erythema  Skin: no skin lesions or scars  B Lachmann's (-)  B Ciarra's (-)  B patellar apprehension (-)  B patellar grind (-)  B medial JLT (-)  B lateral JLT (-)  B varus  stress (-)  B valgus stress (-)  B anterior drawer (-)  B posterior drawer (-)    Physical exam as above except:  Positive patellar grind on the right, relative atrophy of right VMO      Assessment/Plan   Diagnoses and all orders for this visit:  Patellofemoral syndrome of right knee  -     Referral to Physical Therapy; Future  -     meloxicam (Mobic) 7.5 mg tablet; Take 1 tablet (7.5 mg) by mouth once daily.    48-year-old female with right-sided anterior knee pain based on history and physical most consistent with patellofemoral knee pain secondary to poor patellar tracking due to VMO weakness.  I did discuss with patient that the mainstay of treatment is going to be VMO strengthening and stabilization of her knee and that while certainly an injection may help temporarily it is not going to fix  her long-term issue.  No evidence of any ligamentous or meniscal injury and does not seem to have any arthritis symptoms as well.  I think for the time being it would be reasonable to refer her over to physical therapy as well as get her on a daily anti-inflammatory for couple of months to try to decrease her flares and pain while she is strengthening her VMO.  Plan for today:  - Physical therapy for her right knee.  - Meloxicam 7.5 mg daily, instructed her not to take any other NSAIDs but she may take Tylenol with this medication.  She may follow-up with me as needed.         This note was generated with the aid of dictation software, there may be typos despite my attempts at proofreading.

## 2024-04-02 NOTE — PROGRESS NOTES
Subjective   Patient ID: Renetta Andrea is a 48 y.o. female who presents for Allergies.    Patient is here with complaints of having allergies having drippy nose Claritin is not helping symptoms going on for past 3 months  Also wants to discuss her last blood work    Past recap  Patient is here for follow-up on Ozempic  She has lost 5/ 6 pounds on Ozempic  She is tolerating well  She is interested in increasing the dose  Yesterday she was started on Lamictal by the psychiatrist     PAST RECAP   patient is here for physical  High cholesterol needs medication refill  Anxiety is doing fine   patient wants some diet will to help control her weight he had gastric bypass surgery done  She has not done her routine blood for a while     Past recap  Patient is here for follow-up  Follow-up on blood work  Still feels very tired  Has IUD so not sure if she is going through menopause      patient had bariatric surgery done April 26  She has lost from 197 pounds to 179 pounds  She was doing great but recently she is feeling very fatigued  She is denying any abdominal pain  Needs refills on medication for cholesterol        Patient presents with complaints of having both knee pain  Right knee hurts for a while but now left knee started hurting.  She started hurting after recently going for exercise classes where there was lot of bending and moving  Follow-up on blood work  Having problems with insomnia trazodone is not helping  She does eat a lot of fast food     past recap  Patient presents with complaints of having jerking movements  Over the body jerks leg jerks arms legs head jerks  She is concerned she is having seizure  She had these movements going on off and on but more pronounced recently. No correlation with any activity  She is on Paxil for more than 20 years and takes hydroxyzine for anxiety  She is going for stress test through cardiology again  Legs also cramping at night     She is going to the gastric  "bypass program and working with the dietitian to lose weight        To establish PCP  Moved from [place] to [place]  She is on disability for anxiety and bipolar  She is going to establish with a new psychiatrist  She wants to go for gastric bypass surgery because she stated else okay able to lose her weight     Social history: Non-smoker no drugs weekend cocktail, 3 kids youngest 8 years old  Past medical history: Obstructive sleep apnea, anxiety, bipolar, cholecystectomy  Occupation: Retired social service on disability   Review of Systems    Objective   /80   Ht 1.575 m (5' 2\")   Wt 69.9 kg (154 lb)   BMI 28.17 kg/m²     Physical Exam  Vitals reviewed.   Constitutional:       Appearance: Normal appearance.   HENT:      Head: Normocephalic and atraumatic.      Right Ear: Tympanic membrane, ear canal and external ear normal.      Left Ear: Tympanic membrane, ear canal and external ear normal.      Nose: Nose normal.      Mouth/Throat:      Pharynx: Oropharynx is clear.   Eyes:      Extraocular Movements: Extraocular movements intact.      Conjunctiva/sclera: Conjunctivae normal.      Pupils: Pupils are equal, round, and reactive to light.   Cardiovascular:      Rate and Rhythm: Normal rate and regular rhythm.      Pulses: Normal pulses.      Heart sounds: Normal heart sounds.   Pulmonary:      Effort: Pulmonary effort is normal.      Breath sounds: Normal breath sounds.   Abdominal:      General: Abdomen is flat. Bowel sounds are normal.      Palpations: Abdomen is soft.   Musculoskeletal:      Cervical back: Normal range of motion and neck supple.   Skin:     General: Skin is warm and dry.   Neurological:      General: No focal deficit present.      Mental Status: She is alert and oriented to person, place, and time.   Psychiatric:         Mood and Affect: Mood normal.         Assessment/Plan   Problem List Items Addressed This Visit    None  Visit Diagnoses       Allergy, initial encounter        " Relevant Medications    fluticasone (Flonase) 50 mcg/actuation nasal spray    Other Relevant Orders    Respiratory Allergy Profile IgE    Food Allergy Profile IgE    Other decreased white blood cell (WBC) count        Relevant Orders    CBC (Completed)             11/18  Last blood work showed elevated blood sugar  Advised patient to check blood work to rule out any electrolyte imbalance causing the jerking  Other possibility could be clonus activity related to SSRI use for long-term  Check blood work and refer patient to neurology  Blood work ordered for cholesterol and high sugar  Discussed sleep hygiene  Advised to try melatonin if trazodone is not working  Again discussed diet exercise losing weight  Follow-up after blood work           3/15/23  Physical  Routine blood work ordered encourage patient to go for weight loss clinic  Discussed diet and exercise and she has achieved her goal weight she is still insistent on taking Ozempic  Prescription for Ozempic given  Refill given on Lipitor     4/12/23  Will increase dose of Ozempic  Advised patient to follow-up in 1 month for GYN exam  Discussed diet and exercise  Follow-up in a month     16 2023  KOH prep done  We will call with the results  Urine for GC and chlamydia ordered    4/2/2024  Patient has inferior turbinate hypertrophy  Will check for allergies  Use Flonase nasal spray twice a day  Use Claritin D  If not better will try prednisone  Allergy test to help decide if patient will be candidate for allergy shots  Leukopenia which could be from the Lamictal  Check CBC

## 2024-04-03 LAB
A ALTERNATA IGE QN: 1.37 KU/L
A FUMIGATUS IGE QN: <0.1 KU/L
BERMUDA GRASS IGE QN: <0.1 KU/L
BOXELDER IGE QN: <0.1 KU/L
C HERBARUM IGE QN: 0.21 KU/L
CALIF WALNUT POLN IGE QN: <0.1 KU/L
CAT DANDER IGE QN: <0.1 KU/L
CLAM IGE QN: <0.1 KU/L
CMN PIGWEED IGE QN: <0.1 KU/L
CODFISH IGE QN: <0.1 KU/L
COMMON RAGWEED IGE QN: <0.1 KU/L
CORN IGE QN: <0.1
COTTONWOOD IGE QN: <0.1 KU/L
D FARINAE IGE QN: <0.1 KU/L
D PTERONYSS IGE QN: <0.1 KU/L
DOG DANDER IGE QN: <0.1 KU/L
EGG WHITE IGE QN: <0.1 KU/L
ENGL PLANTAIN IGE QN: <0.1 KU/L
GOOSEFOOT IGE QN: <0.1 KU/L
JOHNSON GRASS IGE QN: <0.1 KU/L
KENT BLUE GRASS IGE QN: <0.1 KU/L
LONDON PLANE IGE QN: <0.1 KU/L
MILK IGE QN: <0.1 KU/L
MT JUNIPER IGE QN: <0.1 KU/L
P NOTATUM IGE QN: <0.1 KU/L
PEANUT IGE QN: <0.1 KU/L
PECAN/HICK TREE IGE QN: <0.1 KU/L
ROACH IGE QN: <0.1 KU/L
SALTWORT IGE QN: <0.1 KU/L
SCALLOP IGE QN: <0.1 KU/L
SESAME SEED IGE QN: <0.1 KU/L
SHEEP SORREL IGE QN: <0.1 KU/L
SHRIMP IGE QN: <0.1 KU/L
SILVER BIRCH IGE QN: <0.1 KU/L
SOYBEAN IGE QN: <0.1 KU/L
TIMOTHY IGE QN: <0.1 KU/L
TOTAL IGE SMQN RAST: 73.1 KU/L
WALNUT IGE QN: <0.1 KU/L
WHEAT IGE QN: <0.1 KU/L
WHITE ASH IGE QN: <0.1 KU/L
WHITE ELM IGE QN: <0.1 KU/L
WHITE MULBERRY IGE QN: <0.1 KU/L
WHITE OAK IGE QN: <0.1 KU/L

## 2024-04-13 ENCOUNTER — TELEPHONE (OUTPATIENT)
Dept: PRIMARY CARE | Facility: CLINIC | Age: 49
End: 2024-04-13
Payer: COMMERCIAL

## 2024-07-08 ENCOUNTER — LAB (OUTPATIENT)
Dept: LAB | Facility: LAB | Age: 49
End: 2024-07-08
Payer: COMMERCIAL

## 2024-07-08 DIAGNOSIS — E46 UNSPECIFIED PROTEIN-CALORIE MALNUTRITION (MULTI): ICD-10-CM

## 2024-07-08 DIAGNOSIS — E55.9 VITAMIN D DEFICIENCY, UNSPECIFIED: ICD-10-CM

## 2024-07-08 DIAGNOSIS — D50.9 IRON DEFICIENCY ANEMIA, UNSPECIFIED: Primary | ICD-10-CM

## 2024-07-08 LAB
25(OH)D3 SERPL-MCNC: 27 NG/ML (ref 30–100)
ALBUMIN SERPL BCP-MCNC: 4.7 G/DL (ref 3.4–5)
ALP SERPL-CCNC: 53 U/L (ref 33–110)
ALT SERPL W P-5'-P-CCNC: 10 U/L (ref 7–45)
ANION GAP SERPL CALC-SCNC: 13 MMOL/L (ref 10–20)
AST SERPL W P-5'-P-CCNC: 12 U/L (ref 9–39)
BILIRUB SERPL-MCNC: 0.6 MG/DL (ref 0–1.2)
BUN SERPL-MCNC: 10 MG/DL (ref 6–23)
CALCIUM SERPL-MCNC: 9.7 MG/DL (ref 8.6–10.6)
CHLORIDE SERPL-SCNC: 99 MMOL/L (ref 98–107)
CHOLEST SERPL-MCNC: 215 MG/DL (ref 0–199)
CHOLESTEROL/HDL RATIO: 3
CO2 SERPL-SCNC: 31 MMOL/L (ref 21–32)
CREAT SERPL-MCNC: 0.84 MG/DL (ref 0.5–1.05)
EGFRCR SERPLBLD CKD-EPI 2021: 84 ML/MIN/1.73M*2
ERYTHROCYTE [DISTWIDTH] IN BLOOD BY AUTOMATED COUNT: 12.1 % (ref 11.5–14.5)
FOLATE SERPL-MCNC: >24 NG/ML
GLUCOSE SERPL-MCNC: 93 MG/DL (ref 74–99)
HCT VFR BLD AUTO: 38.2 % (ref 36–46)
HDLC SERPL-MCNC: 71.2 MG/DL
HGB BLD-MCNC: 12.8 G/DL (ref 12–16)
IRON SERPL-MCNC: 146 UG/DL (ref 35–150)
LDLC SERPL CALC-MCNC: 131 MG/DL
MCH RBC QN AUTO: 34.9 PG (ref 26–34)
MCHC RBC AUTO-ENTMCNC: 33.5 G/DL (ref 32–36)
MCV RBC AUTO: 104 FL (ref 80–100)
NON HDL CHOLESTEROL: 144 MG/DL (ref 0–149)
NRBC BLD-RTO: 0 /100 WBCS (ref 0–0)
PLATELET # BLD AUTO: 233 X10*3/UL (ref 150–450)
POTASSIUM SERPL-SCNC: 3.6 MMOL/L (ref 3.5–5.3)
PROT SERPL-MCNC: 7.4 G/DL (ref 6.4–8.2)
RBC # BLD AUTO: 3.67 X10*6/UL (ref 4–5.2)
SODIUM SERPL-SCNC: 139 MMOL/L (ref 136–145)
TRIGL SERPL-MCNC: 66 MG/DL (ref 0–149)
VIT B12 SERPL-MCNC: 596 PG/ML (ref 211–911)
VLDL: 13 MG/DL (ref 0–40)
WBC # BLD AUTO: 4.3 X10*3/UL (ref 4.4–11.3)

## 2024-07-08 PROCEDURE — 36415 COLL VENOUS BLD VENIPUNCTURE: CPT

## 2024-07-08 PROCEDURE — 82746 ASSAY OF FOLIC ACID SERUM: CPT

## 2024-07-08 PROCEDURE — 80053 COMPREHEN METABOLIC PANEL: CPT

## 2024-07-08 PROCEDURE — 83540 ASSAY OF IRON: CPT

## 2024-07-08 PROCEDURE — 82306 VITAMIN D 25 HYDROXY: CPT

## 2024-07-08 PROCEDURE — 85027 COMPLETE CBC AUTOMATED: CPT

## 2024-07-08 PROCEDURE — 82607 VITAMIN B-12: CPT

## 2024-07-08 PROCEDURE — 80061 LIPID PANEL: CPT

## 2024-08-28 ENCOUNTER — OFFICE VISIT (OUTPATIENT)
Dept: NEUROLOGY | Facility: HOSPITAL | Age: 49
End: 2024-08-28
Payer: COMMERCIAL

## 2024-08-28 VITALS
BODY MASS INDEX: 28.52 KG/M2 | SYSTOLIC BLOOD PRESSURE: 122 MMHG | HEART RATE: 79 BPM | WEIGHT: 155 LBS | DIASTOLIC BLOOD PRESSURE: 76 MMHG | HEIGHT: 62 IN

## 2024-08-28 DIAGNOSIS — G25.9 FUNCTIONAL MOVEMENT DISORDER: ICD-10-CM

## 2024-08-28 DIAGNOSIS — G25.3 MYOCLONUS: Primary | ICD-10-CM

## 2024-08-28 PROCEDURE — 3008F BODY MASS INDEX DOCD: CPT | Performed by: STUDENT IN AN ORGANIZED HEALTH CARE EDUCATION/TRAINING PROGRAM

## 2024-08-28 PROCEDURE — 99214 OFFICE O/P EST MOD 30 MIN: CPT | Performed by: STUDENT IN AN ORGANIZED HEALTH CARE EDUCATION/TRAINING PROGRAM

## 2024-08-28 NOTE — PATIENT INSTRUCTIONS
Dear Renetta    It was a pleasure to see you in clinic today. I have ordered the EEG for you. Please call [phone] to arrange for the test.    I have also placed a referral for movement disorders.

## 2024-08-30 ENCOUNTER — HOSPITAL ENCOUNTER (OUTPATIENT)
Dept: NEUROLOGY | Facility: HOSPITAL | Age: 49
Discharge: HOME | End: 2024-08-30
Payer: COMMERCIAL

## 2024-08-30 DIAGNOSIS — G25.3 MYOCLONUS: ICD-10-CM

## 2024-08-31 NOTE — PROGRESS NOTES
Subjective     Renetta Andrea is a 49 y.o. year old right-handed female who presents for follow up of twitching.    4-D CLASSIFICATION:  Type: Psychogenic NEPE  Semiology: Neck myoclonic->B/L whole body myoclonic episode      Lateralizing signs: None      Diagnostic signs: None      Frequency: Multiple every day  EZ: N/A  Etiology: Unknown  RMC: Bipolar, SONYA, anxiety    Current AEDs: None  Past AEDs: None    HPI  INTERVAL HX:  She was last seen in office back in 2022 for similar myoclonic jerks. 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 Back then, these were mainly occurring at night as hypnic jerks.    She reports that the jerks are worse now and occur throughout the day and affect her neck and upper body. She had multiple in office.       Current Outpatient Medications:     ALPRAZolam (Xanax) 0.5 mg tablet, Take 1 tablet (0.5 mg) by mouth once daily as needed for anxiety., Disp: , Rfl:     ARIPiprazole (Abilify) 10 mg tablet, Take 1 tablet (10 mg) by mouth once daily., Disp: , Rfl:     atorvastatin (Lipitor) 10 mg tablet, Take 1 tablet (10 mg) by mouth once daily., Disp: 90 tablet, Rfl: 1    benztropine (Cogentin) 1 mg tablet, Take 1 tablet (1 mg) by mouth once daily., Disp: , Rfl:     escitalopram (Lexapro) 10 mg tablet, Take 1 tablet (10 mg) by mouth once daily., Disp: , Rfl:     fluticasone (Flonase) 50 mcg/actuation nasal spray, Administer 1 spray into each nostril once daily. Shake gently. Before first use, prime pump. After use, clean tip and replace cap., Disp: 16 g, Rfl: 0    hydrOXYzine pamoate (Vistaril) 25 mg capsule, take 1 capsule by mouth three times a day if needed for anxiety, Disp: , Rfl:     ketoconazole (NIZOral) 2 % shampoo, USE TO WASH SCALP 2-3 TIMES PER WEEK, Disp: , Rfl:     lamoTRIgine (LaMICtal) 100 mg tablet, Take 1 tablet (100  "mg) by mouth once daily., Disp: , Rfl:     semaglutide (OZEMPIC) 1 mg/dose (4 mg/3 mL) pen injector, Inject 1 mg under the skin 1 (one) time per week., Disp: 3 mL, Rfl: 1  No Known Allergies    Review of Systems  As per HPI, otherwise all other systems have been reviewed are negative for complaint.      Objective   /76   Pulse 79   Ht 1.575 m (5' 2\")   Wt 70.3 kg (155 lb)   BMI 28.35 kg/m²     Neurological Exam  Physical Exam  Awake, alert, oriented, normal language function for comprehension and fluency    Multiple slow jerky movements mainly affecting the neck causing forward head movements    Assessment/Plan   Renetta Andrea is a 49 y.o. year old right-handed female who presents for follow up of twitching.    4-D CLASSIFICATION:  Type: Psychogenic NEPE  Semiology: Neck myoclonic->B/L whole body myoclonic episode      Lateralizing signs: None      Diagnostic signs: None      Frequency: Multiple every day  EZ: N/A  Etiology: Unknown  RMC: Bipolar, SONYA, anxiety    Current AEDs: None  Past AEDs: None    Plan:  - The movements witnesses in office appear functional. I will record another outpatient EEG with extra sEMG electrodes to better characterize the movement.   - I have also placed a referral for movement disorders.  - She does not need follow up in epilepsy clinic unless the EEG reveals clear evidence of epilepsy.    Dandre Mccormack MD  [facility]    The total appointment time today was 30 minutes. Time included preparing to see the patient, obtaining the history, performing a medically necessary appropriate physical examination, counseling and educating the patient/family/caregiver, ordering medications, tests and procedures, referring and communicating with other providers, independently interpreting results and communicating the results to the patient/family/caregiver, care coordination] and documenting clinical information in the medical record.                "

## 2024-09-07 ENCOUNTER — HOSPITAL ENCOUNTER (EMERGENCY)
Facility: HOSPITAL | Age: 49
Discharge: HOME | End: 2024-09-07
Attending: STUDENT IN AN ORGANIZED HEALTH CARE EDUCATION/TRAINING PROGRAM
Payer: COMMERCIAL

## 2024-09-07 VITALS
HEART RATE: 86 BPM | HEIGHT: 62 IN | SYSTOLIC BLOOD PRESSURE: 130 MMHG | OXYGEN SATURATION: 97 % | RESPIRATION RATE: 18 BRPM | BODY MASS INDEX: 29.62 KG/M2 | DIASTOLIC BLOOD PRESSURE: 70 MMHG | WEIGHT: 160.94 LBS

## 2024-09-07 DIAGNOSIS — G25.69 TICS OF ORGANIC ORIGIN: Primary | ICD-10-CM

## 2024-09-07 PROCEDURE — 99283 EMERGENCY DEPT VISIT LOW MDM: CPT

## 2024-09-07 RX ORDER — BENZTROPINE MESYLATE 1 MG/1
2 TABLET ORAL 3 TIMES DAILY
Qty: 180 TABLET | Refills: 0 | Status: SHIPPED | OUTPATIENT
Start: 2024-09-07 | End: 2024-10-07

## 2024-09-07 RX ORDER — LORAZEPAM 0.5 MG/1
0.5 TABLET ORAL 2 TIMES DAILY PRN
Qty: 10 TABLET | Refills: 0 | Status: SHIPPED | OUTPATIENT
Start: 2024-09-07

## 2024-09-07 ASSESSMENT — PAIN - FUNCTIONAL ASSESSMENT
PAIN_FUNCTIONAL_ASSESSMENT: 0-10
PAIN_FUNCTIONAL_ASSESSMENT: 0-10

## 2024-09-07 ASSESSMENT — PAIN SCALES - GENERAL
PAINLEVEL_OUTOF10: 0 - NO PAIN
PAINLEVEL_OUTOF10: 0 - NO PAIN

## 2024-09-07 ASSESSMENT — COLUMBIA-SUICIDE SEVERITY RATING SCALE - C-SSRS
2. HAVE YOU ACTUALLY HAD ANY THOUGHTS OF KILLING YOURSELF?: NO
6. HAVE YOU EVER DONE ANYTHING, STARTED TO DO ANYTHING, OR PREPARED TO DO ANYTHING TO END YOUR LIFE?: NO
1. IN THE PAST MONTH, HAVE YOU WISHED YOU WERE DEAD OR WISHED YOU COULD GO TO SLEEP AND NOT WAKE UP?: NO

## 2024-09-08 NOTE — ED PROVIDER NOTES
History of Present Illness     History provided by: Patient  Limitations to History: None  External Records Reviewed with Brief Summary:  Outpatient note from neurology does not    HPI:  Renetta Andrea is a 49 y.o. female presents with muscle tics.  Patient states that she has been having muscle twitching of her neck for some time.  She has been seen by a neurologist and has a follow-up appointment scheduled in October.  She states that her tics have been getting worse and are causing headaches, muscle tension, neck pain.  She states that last night she was attempting to watch a movie and she had so many ticks she had to pause the movie in order to regain composure.  She has a history of bipolar disorder, currently stabilized on Abilify.  No other aches or pains.    Physical Exam   Triage vitals:  T    HR 86  /72  RR 15  O2 100 % None (Room air)    Physical Exam  Vitals and nursing note reviewed.   Constitutional:       General: She is not in acute distress.     Appearance: She is not ill-appearing.   HENT:      Head: Normocephalic and atraumatic.      Mouth/Throat:      Mouth: Mucous membranes are moist.      Pharynx: Oropharynx is clear.   Eyes:      Extraocular Movements: Extraocular movements intact.      Conjunctiva/sclera: Conjunctivae normal.      Pupils: Pupils are equal, round, and reactive to light.   Neck:      Comments: Involuntary movement of patient's neck, moving her head forward, no stiffness, full range of motion  Cardiovascular:      Rate and Rhythm: Normal rate and regular rhythm.   Pulmonary:      Effort: Pulmonary effort is normal. No respiratory distress.      Breath sounds: Normal breath sounds.   Abdominal:      General: There is no distension.      Palpations: Abdomen is soft.      Tenderness: There is no abdominal tenderness.   Musculoskeletal:         General: No swelling or deformity. Normal range of motion.   Skin:     General: Skin is warm and dry.      Capillary  Refill: Capillary refill takes less than 2 seconds.   Neurological:      General: No focal deficit present.      Mental Status: She is alert and oriented to person, place, and time. Mental status is at baseline.   Psychiatric:         Mood and Affect: Mood normal.         Behavior: Behavior normal.        Medical Decision Making & ED Course   Medical Decision Makin y.o. female presents with muscle twitching.  Considered extraparametal symptoms, functional movement disorder, Tourette's.  Reviewed patient's medications with her.  Patient unsure if she is taking Cogentin or not, but chart review reveals Cogentin prescription for 1 mg twice daily.  At this time we will increase her Cogentin to 2 mg 3 times a day.  After further research, Ativan used for akathisia and anxiety related to muscle twitching secondary to antipsychotic use.  Patient understands that she will likely need behavioral therapy for this and plans to keep her outpatient follow-up as scheduled.  Will prescribe a short course of Ativan to use as needed when her twitching is at its most severe.  Patient discharged with recommended outpatient follow-up with her neurologist as scheduled.  ----    Social Determinants of Health which Significantly Impact Care: Mental health disorder The following actions were taken to address these social determinants: patient stabilized on her medications, encouraged to continue her therapies    EKG Independent Interpretation: EKG not obtained    Independent Result Review and Interpretation: None obtained    Chronic conditions affecting the patient's care: As documented above in [facility]    The patient was discussed with the following consultants/services: None    Care Considerations: As documented above in [facility]    ED Course:  Diagnoses as of 243   Tics of organic origin       Procedures   Procedures    Kirsten Adams MD  Emergency Medicine     Kirsten Adams MD  24 0013

## 2024-09-08 NOTE — DISCHARGE INSTRUCTIONS
You were seen in the Emergency Department today for a movement disorder.  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  I have increased the Cogentin that you have previously been prescribed.  I have also given you a short course of a anxiolytic called lorazepam.  I strongly commend that you use this sparingly and only when the symptoms are very severe.  I strongly recommend that you follow-up with your neurologist as scheduled.  If you have any other concerns, please return to the emergency department for evaluation.

## 2024-09-18 ENCOUNTER — OFFICE VISIT (OUTPATIENT)
Dept: NEUROLOGY | Facility: HOSPITAL | Age: 49
End: 2024-09-18
Payer: COMMERCIAL

## 2024-09-18 VITALS
HEART RATE: 79 BPM | HEIGHT: 62 IN | SYSTOLIC BLOOD PRESSURE: 115 MMHG | WEIGHT: 160 LBS | DIASTOLIC BLOOD PRESSURE: 75 MMHG | BODY MASS INDEX: 29.44 KG/M2

## 2024-09-18 DIAGNOSIS — G25.9 FUNCTIONAL MOVEMENT DISORDER: Primary | ICD-10-CM

## 2024-09-18 PROCEDURE — G2211 COMPLEX E/M VISIT ADD ON: HCPCS | Performed by: STUDENT IN AN ORGANIZED HEALTH CARE EDUCATION/TRAINING PROGRAM

## 2024-09-18 PROCEDURE — 3008F BODY MASS INDEX DOCD: CPT | Performed by: STUDENT IN AN ORGANIZED HEALTH CARE EDUCATION/TRAINING PROGRAM

## 2024-09-18 PROCEDURE — 99215 OFFICE O/P EST HI 40 MIN: CPT | Performed by: STUDENT IN AN ORGANIZED HEALTH CARE EDUCATION/TRAINING PROGRAM

## 2024-09-18 PROCEDURE — 1036F TOBACCO NON-USER: CPT | Performed by: STUDENT IN AN ORGANIZED HEALTH CARE EDUCATION/TRAINING PROGRAM

## 2024-09-18 NOTE — PATIENT INSTRUCTIONS
Thank you for your visit today. 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 If you have any questions or need to reach me, call my office at [phone].    For more information on functional neurological disorders, visit:  [email or web address]  [email or web address]     We discussed the following plan today:   Physical therapy referral  Return in 4 months

## 2024-09-18 NOTE — PROGRESS NOTES
Subjective     Renetta Andrea is a right handed  49 y.o. year old female who presents with No chief complaint on file..   Visit type: new patient visit She has seen 3 other neurologists    She has had intermittent brief muscle jerks of the neck for the past 3 years. It will occur randomly and there are no triggers. The neck will briefly jerk to one side or the other, or it will flex. Sometimes, her trunk will briefly extend. Initially it was more at night at sleep onset, but is now occurring all day. It tends to occur while she is relaxed, and will not occur while she is focused on activity. The characteristics has not evolved over time. There is no premonition, nor any preceding urge. She cannot suppress it. A week ago she was having such frequent jerks that she went to the ER.    She denies history of tics in youth. She sees a psychiatrist for PTSD, CAN, and is on Abilify for 3+ years.    She works as a care coordinator for .    Patient Active Problem List   Diagnosis    Age-related nuclear cataract of both eyes    Anemia    Antibiotic-induced yeast infection    Anxiety    Astigmatism of both eyes    Bariatric surgery status    Bilateral myopia    Bursitis of knee    Conjunctivitis, acute    Fatigue    Hyperlipidemia    Insomnia    Leg cramps    Low blood sugar    Myoclonus    Obesity (BMI 35.0-39.9 without comorbidity)    Obstructive sleep apnea of adult    Vitamin D deficiency    Class 1 obesity with body mass index (BMI) of 32.0 to 32.9 in adult    Preventative health care    Allergic rhinitis    Bipolar disorder, currently in remission, most recent episode unspecified (Multi)    Bulimia nervosa (Multi)    Calcaneal spur    Abnormal cells of cervix    Dysplasia of cervix (uteri)    Heel pain    Heel pain, bilateral    History of attention deficit hyperactivity disorder (ADHD)    Itching    Major depressive disorder    Recurrent major depressive disorder (CMS-HCC)    Nuclear senile cataract     Panic disorder without agoraphobia    Bursitis    Plantar fascial fibromatosis    Plantar fasciitis    Post traumatic stress disorder    Presence of intrauterine contraceptive device (IUD)    Cryptomerorachischisis    Spina bifida occulta    Uterine leiomyoma    Obesity    Insomnia due to other mental disorder    Mood disorder (CMS-HCC)    Obstructive sleep apnea syndrome    Acute bilateral otitis media    Acute pain of left knee    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   No past surgical history on file.   Social History     Socioeconomic History    Marital status: Single     Spouse name: Not on file    Number of children: Not on file    Years of education: Not on file    Highest education level: Not on file   Occupational History    Not on file   Tobacco Use    Smoking status: Never    Smokeless tobacco: Never   Substance and Sexual Activity    Alcohol use: Not on file    Drug use: Not on file    Sexual activity: Not on file   Other Topics Concern    Not on file   Social History Narrative    Not on file     Social Determinants of Health     Financial Resource Strain: Not on File (12/9/2021)    Received from [facility]    Financial Resource Strain     Financial Resource Strain: 0   Recent Concern: Financial Resource Strain - At Risk (12/9/2021)    Received from [facility]    Financial Resource Strain     Financial Resource Strain: 2   Food Insecurity: Not on file (9/7/2024)   Transportation Needs: Not on File (12/9/2021)    Received from [facility]    Transportation Needs     Transportation: 0   Physical Activity: Not on File (11/22/2021)    [facility] from [facility]    Physical Activity     Physical Activity: 0   Stress: Not on File (12/9/2021)    Received from [facility]    Stress     Stress: 0   Social Connections: Not on File (12/9/2021)     "Received from [facility]    Social Connections     Connectedness: 0   Intimate Partner Violence: Not on file   Housing Stability: Not on File (2021)    Received from [facility]    Housing Stability     Housin      Family History   Problem Relation Name Age of Onset    Diabetes Mother      Hypertension Mother                   Review of Systems  All other system have been reviewed and are negative for complaint.    Vitals:    24 0839   BP: 115/75   Pulse: 79   Weight: 72.6 kg (160 lb)   Height: 1.575 m (5' 2\")     Objective   Neurological Exam  Mental Status  Awake, alert and oriented to person, place and time. Speech is normal. Language is fluent with no aphasia. Attention and concentration are normal.    Cranial Nerves  CN II: Visual fields full to confrontation.  CN III, IV, VI: Extraocular movements intact bilaterally. Normal saccades. Normal smooth pursuit. Normal lids and orbits bilaterally. Pupils equal round and reactive to light bilaterally.  CN V:  Right: Facial sensation is normal.  Left: Facial sensation is normal on the left.  CN VII: Full and symmetric facial movement.  CN VIII: Hearing is normal.  CN IX, X: Palate elevates symmetrically  CN XI: Shoulder shrug strength is normal.  CN XII: Tongue midline without atrophy or fasciculations.    Motor  Normal muscle bulk throughout. No fasciculations present. Normal muscle tone. No abnormal involuntary movements.                                               Right                     Left   Shoulder abduction               5                          5  Elbow flexion                         5                          5  Elbow extension                    5                          5  Finger flexion                         5                          5  Finger extension                    5                          5  Finger abduction                    5                          5  Hip flexion                              5                          " 5  Knee flexion                           5                          5  Plantarflexion                         5                          5  Dorsiflexion                            5                          5  Intermittent neck jerks, with variability in directionality. The length of jerk appears to be too long for myoclonus.    Sensory  Light touch is normal in upper and lower extremities. Normal vibration in distal lower extremities.     Reflexes                                            Right                      Left  Biceps                                 2+                         2+  Triceps                                2+                         2+  Patellar                                2+                         2+  Achilles                                2+                         2+  Right Plantar: downgoing  Left Plantar: downgoing    Right pathological reflexes: Jose's absent.  Left pathological reflexes: Jose's absent.    Coordination  Right: Finger-to-nose normal.Left: Finger-to-nose normal.    Gait  Casual gait is normal including stance, stride, and arm swing. Normal tandem gait. Romberg is absent.                          Hemoglobin A1C   Date Value Ref Range Status   04/25/2023 5.4 % Final     Comment:          Diagnosis of Diabetes-Adults   Non-Diabetic: < or = 5.6%   Increased risk for developing diabetes: 5.7-6.4%   Diagnostic of diabetes: > or = 6.5%  .       Monitoring of Diabetes                Age (y)     Therapeutic Goal (%)   Adults:          >18           <7.0   Pediatrics:    13-18           <7.5                   7-12           <8.0                   0- 6            7.5-8.5   American Diabetes Association. Diabetes Care 33(S1), Jan 2010.       Estimated Average Glucose   Date Value Ref Range Status   04/25/2023 108 MG/DL Final     TSH   Date Value Ref Range Status   04/25/2023 0.38 (L) 0.44 - 3.98 mIU/L Final     Comment:      TSH testing is performed using different  testing    methodology at [facility] than at other    [facility]. Direct result comparisons should    only be made within the same method.       Folate, Serum   Date Value Ref Range Status   07/08/2024 >24.0 >5.0 ng/mL Final           Assessment/Plan   Problem List Items Addressed This Visit    None  Visit Diagnoses         Codes    Functional movement disorder    -  Primary G25.9    Relevant Orders    Referral to Physical Therapy            Renetta Andrea is a 49 y.o. year old female with PTSD, CAN, here for evaluation of intermittent neck jerks. There appears to be considerable variability in distribution and duration, and there are no features of tics. I discussed the diagnosis of probable FMD.  We discussed the following plan today:   Physical therapy referral  Return in 4 months

## 2024-09-23 ENCOUNTER — APPOINTMENT (OUTPATIENT)
Dept: SLEEP MEDICINE | Facility: CLINIC | Age: 49
End: 2024-09-23
Payer: COMMERCIAL

## 2024-09-23 VITALS
SYSTOLIC BLOOD PRESSURE: 132 MMHG | OXYGEN SATURATION: 98 % | TEMPERATURE: 97.1 F | DIASTOLIC BLOOD PRESSURE: 82 MMHG | WEIGHT: 164.2 LBS | BODY MASS INDEX: 30.03 KG/M2 | HEART RATE: 71 BPM

## 2024-09-23 DIAGNOSIS — G47.33 OBSTRUCTIVE SLEEP APNEA OF ADULT: ICD-10-CM

## 2024-09-23 DIAGNOSIS — G47.33 OSA (OBSTRUCTIVE SLEEP APNEA): Primary | ICD-10-CM

## 2024-09-23 PROBLEM — E66.9 OBESITY (BMI 35.0-39.9 WITHOUT COMORBIDITY): Status: RESOLVED | Noted: 2023-03-08 | Resolved: 2024-09-23

## 2024-09-23 PROCEDURE — 99214 OFFICE O/P EST MOD 30 MIN: CPT | Performed by: STUDENT IN AN ORGANIZED HEALTH CARE EDUCATION/TRAINING PROGRAM

## 2024-09-23 PROCEDURE — 1036F TOBACCO NON-USER: CPT | Performed by: STUDENT IN AN ORGANIZED HEALTH CARE EDUCATION/TRAINING PROGRAM

## 2024-09-23 PROCEDURE — G2211 COMPLEX E/M VISIT ADD ON: HCPCS | Performed by: STUDENT IN AN ORGANIZED HEALTH CARE EDUCATION/TRAINING PROGRAM

## 2024-09-23 ASSESSMENT — ENCOUNTER SYMPTOMS
RESPIRATORY NEGATIVE: 1
CONSTITUTIONAL NEGATIVE: 1
PSYCHIATRIC NEGATIVE: 1
CARDIOVASCULAR NEGATIVE: 1
NEUROLOGICAL NEGATIVE: 1

## 2024-09-23 ASSESSMENT — PAIN SCALES - GENERAL: PAINLEVEL: 0-NO PAIN

## 2024-09-23 NOTE — ASSESSMENT & PLAN NOTE
Have been pretty inconsistent with PAP usage, though patient reports that she uses all the time.  Average usage time is about 3-4 hours a night.  There is quite a bit of leak and rAHI ~ 10 (checking the machine)  Lengthy troubleshooting today, fitted her with hollisi (L) and taught her breathing techniques and she liked it and felt good  Adjusted the setting to 9-14 cwp.  See back in 2 months and see how she does

## 2024-09-23 NOTE — PROGRESS NOTES
Patient: Bonnie Andrea    [id number]  : 1975 -- AGE 49 y.o.    Provider: Doug Freeman MD     Location [facility]   Service Date: 2024              [facility]  Followup Visit Note    ASSESSMENT/PLAN     Ms. Jaxon Andrea is a 49 y.o. female and she returns in followup to the [facility] for the problems listed below.    Problem List, Orders, Assessment, Recommendations:  Problem List Items Addressed This Visit             ICD-10-CM    BMI 30.0-30.9,adult Z68.30     BMI Readings from Last 1 Encounters:   24 30.03 kg/m²     - encouraged healthy weight loss via diet and exercise           Obstructive sleep apnea of adult G47.33     Have been pretty inconsistent with PAP usage, though patient reports that she uses all the time.  Average usage time is about 3-4 hours a night.  There is quite a bit of leak and rAHI ~ 10 (checking the machine)  Lengthy troubleshooting today, fitted her with p30i (L) and taught her breathing techniques and she liked it and felt good  Adjusted the setting to 9-14 cwp.  See back in 2 months and see how she does          Other Visit Diagnoses         Codes    SONYA (obstructive sleep apnea)    -  Primary G47.33    Relevant Orders    Positive Airway Pressure (PAP) Therapy            Disposition    Return to clinic in 2 months         HISTORY OF PRESENT ILLNESS     HISTORY OF PRESENT ILLNESS   Bonnie Andrea is a 49 y.o. female with h/o SONYA and Obesity who presents to a [facility] for followup.     Assessment and plan from last visit: 2022 with Kirsten Sanches    Ms. BONNIE ESPINO is a 47 year female with the following problems:     OBSTRUCTIVE SLEEP APNEA: Good compliance on CPAP 14 cwp. Machine from [employer] [place].   -Will check CO for DS2  -New supply order including mask of choice to [facility] today.  -Sleep apnea and PAP therapy education was  "provided at length in clinic today. BONNIE verbalized understanding.  -Diet, exercise, and weight loss were emphasized today in clinic, as were non-supine sleep, avoiding alcohol in the late evening, and driving or operating heavy machinery when sleepy. BONNIE verbalized understanding.     OCCASIONAL SLEEP ONSET INSOMNIA, likely due to poor sleep hygiene and anxiety. Resolved now.      ANXIETY: BONNIE is taking aripiprazole and Paroxetine.     OBESITY with BMI of 32 Her most recent Bicarb was 28 in March 2022. s/ p Bariatric surgery status.       Current History    On today's visit, the patient reports that she has been having issues with \"insomnia\".  She falls asleep within 30 minute but feels like as if she is not really sleeping.  She also researched on the internet and thinks that she has hypnic jerk.  She reports that she has a lot of jerking episodes throughout the day which may be related to her PTSD.  She has quite a bit of leak as indicated by her machine and a subpar rAHI ~ 10.  She uses a nuance pro mask currently and it didn't appear to be a good fit.    PAP Info  DURABLE MEDICAL EQUIPMENT COMPANY: [employer]  Issues with therapy: not sure, but can't use it too long  Benefits with PAP: PERCEIVED BENEFITS OF PAP: refreshing sleep    PAP Adherence  A PAP adherence download was obtained and data was reviewed personally today in clinic.    RLS Followup:   none.    Daytime Symptoms    Patient reports DAYTIME SYMPTOMS: excessively sleepy during the day    Naps: sometimes  Fatigue: symptoms bothersome, but easily able to carry out all usual work/school/family activities    REVIEW OF SYSTEMS     REVIEW OF SYSTEMS  Review of Systems   Constitutional: Negative.    HENT: Negative.     Respiratory: Negative.     Cardiovascular: Negative.    Genitourinary: Negative.    Skin: Negative.    Neurological: Negative.    Psychiatric/Behavioral: Negative.           ALLERGIES AND MEDICATIONS     ALLERGIES  No " Known Allergies    MEDICATIONS: She has a current medication list which includes the following prescription(s): aripiprazole - Take 1 tablet (10 mg) by mouth once daily, escitalopram - Take 1 tablet (10 mg) by mouth once daily, hydroxyzine pamoate - take 1 capsule by mouth three times a day if needed for anxiety, ketoconazole - USE TO WASH SCALP 2-3 TIMES PER WEEK, alprazolam - Take 1 tablet (0.5 mg) by mouth once daily as needed for anxiety, atorvastatin - Take 1 tablet (10 mg) by mouth once daily (Patient not taking: Reported on 9/23/2024), lamotrigine - Take 1 tablet (100 mg) by mouth once daily, and semaglutide - Inject 1 mg under the skin 1 (one) time per week (Patient not taking: Reported on 9/23/2024).    PAST MEDICAL HISTORY : She  has no past medical history on file.    PAST SURGICAL HISTORY: She  has no past surgical history on file.     FAMILY HISTORY: No changes since previous visit. Otherwise non-contributory as charted.     SOCIAL HISTORY  She  reports that she has never smoked. She has never used smokeless tobacco. No history on file for alcohol use and drug use.       PHYSICAL EXAM     VITAL SIGNS: /82 (BP Location: Left arm, Patient Position: Sitting, BP Cuff Size: Adult)   Pulse 71   Temp 36.2 °C (97.1 °F) (Temporal)   Wt 74.5 kg (164 lb 3.2 oz)   SpO2 98%   BMI 30.03 kg/m²      PREVIOUS WEIGHTS:  Wt Readings from Last 3 Encounters:   09/23/24 74.5 kg (164 lb 3.2 oz)   09/18/24 72.6 kg (160 lb)   09/07/24 73 kg (160 lb 15 oz)         RESULTS/DATA     Bicarbonate (mmol/L)   Date Value   07/08/2024 31   04/25/2023 31   03/15/2023 29   07/11/2022 28     Iron (ug/dL)   Date Value   07/08/2024 146   04/25/2023 74   11/18/2021 83     Iron Saturation (%)   Date Value   04/25/2023 23 (L)   11/18/2021 23 (L)     TIBC (ug/dL)   Date Value   04/25/2023 317   11/18/2021 360

## 2024-09-23 NOTE — ASSESSMENT & PLAN NOTE
BMI Readings from Last 1 Encounters:   09/23/24 30.03 kg/m²     - encouraged healthy weight loss via diet and exercise

## 2024-09-30 ENCOUNTER — OFFICE VISIT (OUTPATIENT)
Dept: PRIMARY CARE | Facility: CLINIC | Age: 49
End: 2024-09-30
Payer: COMMERCIAL

## 2024-09-30 VITALS
BODY MASS INDEX: 29.81 KG/M2 | DIASTOLIC BLOOD PRESSURE: 74 MMHG | WEIGHT: 162 LBS | SYSTOLIC BLOOD PRESSURE: 118 MMHG | HEIGHT: 62 IN

## 2024-09-30 DIAGNOSIS — R79.89 ABNORMAL TSH: ICD-10-CM

## 2024-09-30 DIAGNOSIS — D72.819 LEUKOPENIA, UNSPECIFIED TYPE: ICD-10-CM

## 2024-09-30 DIAGNOSIS — E78.5 DYSLIPIDEMIA: Primary | ICD-10-CM

## 2024-09-30 DIAGNOSIS — T78.40XD ALLERGY, SUBSEQUENT ENCOUNTER: ICD-10-CM

## 2024-09-30 PROCEDURE — 3008F BODY MASS INDEX DOCD: CPT | Performed by: INTERNAL MEDICINE

## 2024-09-30 PROCEDURE — 99213 OFFICE O/P EST LOW 20 MIN: CPT | Performed by: INTERNAL MEDICINE

## 2024-09-30 NOTE — PROGRESS NOTES
Subjective   Patient ID: Renetta Andrea is a 49 y.o. female who presents for Follow-up.    Patient is here for follow-up  Was on Adipex for losing weight  Losing weight is a struggle  Having involuntary motion in the neck feeling woozy neck sore  Saw the neurologist recommended therapy  For anxiety on Abilify and Lexapro and working    Patient is here with complaints of having allergies having drippy nose Claritin is not helping symptoms going on for past 3 months  Also wants to discuss her last blood work    Past recap  Patient is here for follow-up on Ozempic  She has lost 5/ 6 pounds on Ozempic  She is tolerating well  She is interested in increasing the dose  Yesterday she was started on Lamictal by the psychiatrist     PAST RECAP   patient is here for physical  High cholesterol needs medication refill  Anxiety is doing fine   patient wants some diet will to help control her weight he had gastric bypass surgery done  She has not done her routine blood for a while     Past recap  Patient is here for follow-up  Follow-up on blood work  Still feels very tired  Has IUD so not sure if she is going through menopause      patient had bariatric surgery done April 26  She has lost from 197 pounds to 179 pounds  She was doing great but recently she is feeling very fatigued  She is denying any abdominal pain  Needs refills on medication for cholesterol        Patient presents with complaints of having both knee pain  Right knee hurts for a while but now left knee started hurting.  She started hurting after recently going for exercise classes where there was lot of bending and moving  Follow-up on blood work  Having problems with insomnia trazodone is not helping  She does eat a lot of fast food     past recap  Patient presents with complaints of having jerking movements  Over the body jerks leg jerks arms legs head jerks  She is concerned she is having seizure  She had these movements going on off and on but more  "pronounced recently. No correlation with any activity  She is on Paxil for more than 20 years and takes hydroxyzine for anxiety  She is going for stress test through cardiology again  Legs also cramping at night     She is going to the gastric bypass program and working with the dietitian to lose weight        To establish PCP  Moved from [place] to [place]  She is on disability for anxiety and bipolar  She is going to establish with a new psychiatrist  She wants to go for gastric bypass surgery because she stated else okay able to lose her weight     Social history: Non-smoker no drugs weekend cocktail, 3 kids youngest 8 years old  Past medical history: Obstructive sleep apnea, anxiety, bipolar, cholecystectomy  Occupation: Retired social service on disability   Review of Systems    Objective   /74   Ht 1.575 m (5' 2\")   Wt 73.5 kg (162 lb)   BMI 29.63 kg/m²     Physical Exam  Vitals reviewed.   Constitutional:       Appearance: Normal appearance.   HENT:      Head: Normocephalic and atraumatic.      Right Ear: Tympanic membrane, ear canal and external ear normal.      Left Ear: Tympanic membrane, ear canal and external ear normal.      Nose: Nose normal.      Mouth/Throat:      Pharynx: Oropharynx is clear.   Eyes:      Extraocular Movements: Extraocular movements intact.      Conjunctiva/sclera: Conjunctivae normal.      Pupils: Pupils are equal, round, and reactive to light.   Cardiovascular:      Rate and Rhythm: Normal rate and regular rhythm.      Pulses: Normal pulses.      Heart sounds: Normal heart sounds.   Pulmonary:      Effort: Pulmonary effort is normal.      Breath sounds: Normal breath sounds.   Abdominal:      General: Abdomen is flat. Bowel sounds are normal.      Palpations: Abdomen is soft.   Musculoskeletal:      Cervical back: Normal range of motion and neck supple.   Skin:     General: Skin is warm and dry.   Neurological:      General: No focal deficit present.      Mental " Status: She is alert and oriented to person, place, and time.   Psychiatric:         Mood and Affect: Mood normal.         Assessment/Plan   Problem List Items Addressed This Visit    None  Visit Diagnoses       Dyslipidemia    -  Primary    Leukopenia, unspecified type        Allergy, subsequent encounter        Abnormal TSH                   11/18  Last blood work showed elevated blood sugar  Advised patient to check blood work to rule out any electrolyte imbalance causing the jerking  Other possibility could be clonus activity related to SSRI use for long-term  Check blood work and refer patient to neurology  Blood work ordered for cholesterol and high sugar  Discussed sleep hygiene  Advised to try melatonin if trazodone is not working  Again discussed diet exercise losing weight  Follow-up after blood work           3/15/23  Physical  Routine blood work ordered encourage patient to go for weight loss clinic  Discussed diet and exercise and she has achieved her goal weight she is still insistent on taking Ozempic  Prescription for Ozempic given  Refill given on Lipitor     4/12/23  Will increase dose of Ozempic  Advised patient to follow-up in 1 month for GYN exam  Discussed diet and exercise  Follow-up in a month     16 2023  KOH prep done  We will call with the results  Urine for GC and chlamydia ordered    4/2/2024  Patient has inferior turbinate hypertrophy  Will check for allergies  Use Flonase nasal spray twice a day  Use Claritin D  If not better will try prednisone  Allergy test to help decide if patient will be candidate for allergy shots  Leukopenia which could be from the Lamictal  Check CBC    9/30/2024  Blood work reviewed  LDL gone up to 131  TSH suppressed at 0.38.  Usually it is in range but this is the first time it is slightly of the range  Will monitor for now  Leukopenia mild stable probably related to Lamictal  Allergy test was positive for dust mite  Continue allergy nasal spray and  Claritin  Repeat labs in 3 to 6 months

## 2024-10-08 ENCOUNTER — CLINICAL SUPPORT (OUTPATIENT)
Dept: PHYSICAL THERAPY | Facility: CLINIC | Age: 49
End: 2024-10-08
Payer: COMMERCIAL

## 2024-10-08 VITALS — SYSTOLIC BLOOD PRESSURE: 122 MMHG | HEART RATE: 69 BPM | DIASTOLIC BLOOD PRESSURE: 58 MMHG

## 2024-10-08 DIAGNOSIS — G25.9 FUNCTIONAL MOVEMENT DISORDER: Primary | ICD-10-CM

## 2024-10-08 DIAGNOSIS — G25.5 MUSCLE, JERKY MOVEMENTS (UNCONTROLLED): ICD-10-CM

## 2024-10-08 PROCEDURE — 97112 NEUROMUSCULAR REEDUCATION: CPT | Mod: GP | Performed by: PHYSICAL THERAPIST

## 2024-10-08 PROCEDURE — 97161 PT EVAL LOW COMPLEX 20 MIN: CPT | Mod: GP | Performed by: PHYSICAL THERAPIST

## 2024-10-08 ASSESSMENT — PAIN SCALES - GENERAL: PAINLEVEL_OUTOF10: 0 - NO PAIN

## 2024-10-08 ASSESSMENT — ENCOUNTER SYMPTOMS
DEPRESSION: 0
OCCASIONAL FEELINGS OF UNSTEADINESS: 0
LOSS OF SENSATION IN FEET: 0

## 2024-10-08 ASSESSMENT — PAIN - FUNCTIONAL ASSESSMENT: PAIN_FUNCTIONAL_ASSESSMENT: 0-10

## 2024-10-08 NOTE — PROGRESS NOTES
Physical Therapy    Physical Therapy Evaluation and Treatment      Patient Name: Renetta Andrea  MRN: [id number]  Today's Date: 10/13/2024  [facility]  Visit number: 1   Time Entry:   Time Calculation  Start Time: 1623  Stop Time: 1708  Time Calculation (min): 45 min  PT Evaluation Time Entry  PT Evaluation (Low) Time Entry: 33  PT Therapeutic Procedures Time Entry  Neuromuscular Re-Education Time Entry: 12    Assessment:  Patient is a 49 year old female referred to [facility]'s outpatient physical therapy services with a chief complaint of jerks in her upper extremities, lower extremities and neck that occur when she is at rest, especially when she is trying to sleep. Pt. Was seen by neurology who diagnosed her with probable FMD.  The patient is on multiple medications to address co-morbidities including bipolar, anxiety and depressive symptoms. The patient is worried that one of her medications is causing these jerks.  I discussed that this may be a possibility but FMD is another possibility. Pt. Would like to go to her psychiatrist first to rule out the medication issue before starting treatment for physical therapy for FMD.  I educated pt. On FMD diagnosis, discussed that it is a common an treatable diagnosis with physical therapy especially when combined on an interdisciplinary team.  Pt. Will benefit from POC to provide education, establish HEP and reduce her symptoms.        Plan: continue to establish HEP, review FMD prognostic wheel  OP PT Plan  Treatment/Interventions: Cryotherapy, Dry needling, Education/ Instruction, Gait training, Hot pack, Manual therapy, Neuromuscular re-education, Self care/ home management, Therapeutic activities, Taping techniques, Therapeutic exercises  PT Plan: Skilled PT  PT Frequency: 1 time per week  Duration: 6 visits  Onset Date: 09/18/24  Certification Period Start Date: 10/08/24  Certification Period End Date: 01/06/25  Rehab Potential: Good    Current Problem:   1.  "Functional movement disorder  Referral to Physical Therapy    Follow Up In Physical Therapy      2. Muscle, jerky movements (uncontrolled)            Subjective    Patient Report: Pt. Reports that she saw a Neurologist who said it was in \"her head\".  She started Ambilify and she thinks that it is causing the jerks, she is thinking of trying a new medication to see if it helps.  When she is going to rest at night her legs and neck and shoulders also jerk and it happens and it happens in clusters 5 times and then stops.  It happens when she is relaxed.  She had to go to the ER once because she had 40 jerks for 60 minutes and it started to hurt .  When she jerks backwards she gets pain.  Her mom, sister and cousins are in town and they are close. She doesn't have any issues with balance.  She takes medications for her anxiety/depression and jomar.  She goes to [facility] and sees physiatry and counselor, she sees her counselor once a month. She is having a hard time staying asleep vs. Falling asleep.      Home Setup: lives with her son who is 12  Hobbies: listens to music and dancing she does this every weekend, no hobbies  Physical Activity: no current exercise routine \"I gave up on exercise\"  Occupation: care coordinator, works for [employer]   Sleep: 4.5 hours, has sleep apnea and tosses and turns a lot, uses CPAP  Hydration: not much  Falls: none  Pt. Goal: \"learn about what causes her jerks and how to help it\"      HPI: Dr. Ld De La Torre office visit 9/18/24:  \"Renetta Andrea is a 49 y.o. year old female with PTSD, CAN, here for evaluation of intermittent neck jerks. There appears to be considerable variability in distribution and duration, and there are no features of tics. I discussed the diagnosis of probable FMD.  We discussed the following plan today:   Physical therapy referral  Return in 4 months\"     Precautions: minimal fall risk, hx. Of anxiety, depression, bipolar and being " followed by psychiatry      Vital Signs: 122/58, sitting LUE automatic cuff, HR=69 BPM     Pain: 0/10          Objective   Tremor=pt. Reports jerks at night before bed  Observation=restless leg syndrome in RLE  Coordination=wnl  Vision=glasses for driving  Hearing=WNL  Sensation=WnL  Edema=neg.   Gait=reciprocal pattern, normal speed    Lower Extremity Strength MMT (tested in seated)    Left Right   Hip flexion 4/5 4/5   Hip ADD  4/5 4/5   Hip ABD 4/5 4/5   Knee flexion 5/5 5/5   Knee Extension 4/5 4/5   Ankle Dorsiflexion 4+/5 4+/5   Ankle Plantarflexion 4+/5 4+/5   Ankle Inversion 4+/5 4+/5   Ankle Eversion 4+/5 4+/5         Outcome Measures:      5x sit<>Stand test= 9 sec. No UE support  30 second sit<>stand test= 14 reps with no UE support    Treatments:    Neuro Re-Ed:  -Neurologic specific education on s/s of FMD, provided picture analysis including river with river overflow, FMD prognostic wheel  -Educated pt. On importance of sleep and how sleep deprivation can trigger jerks, ticks  -Educated pt. On importance of daily walking, spending time in daylight to promote vitamin D and natural sleep/wake cycles  -Educated pt. On importance of proper hydration  -provided pt. With progressive walking program to improve overall activity levels and assist with sleep/wake cycles    EDUCATION:  -see treatment/flowsheet       Goals:  Active       PT Problem       PT Goal 1       Start:  10/13/24    Expected End:  01/06/25       Pt. Will perform diagnostic wheel of FMD to indicate which factors may be contributing to her symptoms.         PT Goal 2       Start:  10/13/24    Expected End:  01/06/25       Pt. Will have a reduction in jerks before bed by end of POC to improve QOL.         PT Goal 3       Start:  10/13/24    Expected End:  01/06/25       Pt. Will adhere to daily walking program, general strengthening program to increase daily activity, provide nervous system regulation, promote self efficacy, improve sleep by  "end of POC.           Patient Stated Goal 1       Start:  10/13/24    Expected End:  01/06/25       \"learn about what causes her jerks and how to help it\"                     "

## 2024-10-08 NOTE — PROGRESS NOTES
"Physical Therapy    Physical Therapy Evaluation and Treatment      Patient Name: Renetta Andrea  MRN: [id number]  Today's Date: 10/8/2024  [facility]  Time Entry:                             Assessment:        Plan:       Current Problem:   No diagnosis found.    Subjective    Patient Report: Pt. Reports that she saw a Neurologist who said it was in \"her head\".  She started Ambilify and she thinks that it is causing the jerks, she is thinking of trying a new medication to see if it helps.  When she is going to rest at night ehr legs and neck and shoulders also jerk and it happens and it happens in clusters 5 times and then stops.  It happens when she is relaxed.  She had to go to the ER because she had 40 jerks for 60 minutes and it started to hurt .      CLOF:  PLOF:  Home Setup:  Equipment:  Hobbies: listens to music and dancing  Physical Activity:  Occupation: care coordinator, works for [employer]   Sleep:  Hydration:   Falls:  Pt. Goal:    HPI:      Precautions:     Vital Signs:     Pain:          Objective   Cognition:  observation=  Posture=  Tremor=  Tone (Modified Kristi Scale)=  Coordination=  Vision=  Hearing=  Sensation=  Proprioception=  Edema=  Gait=    Lower Extremity Strength MMT (tested in seated)    Left Right   Hip flexion ***/5 ***/5   Hip ADD  ***/5 ***/5   Hip ABD ***/5 ***/5   Knee flexion ***/5 ***/5   Knee Extension ***/5 ***/5   Ankle Dorsiflexion ***/5 ***/5   Ankle Plantarflexion ***/5 ***/5   Ankle Inversion ***/5 ***/5   Ankle Eversion ***/5 ***/5             Outcome Measures:  {PT Outcome Measures:57875}     Treatments:  {PT Treatments:50941}    EDUCATION:       Goals:            "

## 2024-10-17 ENCOUNTER — APPOINTMENT (OUTPATIENT)
Dept: NEUROLOGY | Facility: CLINIC | Age: 49
End: 2024-10-17
Payer: COMMERCIAL

## 2024-10-24 ENCOUNTER — APPOINTMENT (OUTPATIENT)
Dept: PRIMARY CARE | Facility: CLINIC | Age: 49
End: 2024-10-24
Payer: COMMERCIAL

## 2024-11-07 ENCOUNTER — OFFICE VISIT (OUTPATIENT)
Dept: PRIMARY CARE | Facility: CLINIC | Age: 49
End: 2024-11-07
Payer: COMMERCIAL

## 2024-11-07 VITALS
SYSTOLIC BLOOD PRESSURE: 120 MMHG | WEIGHT: 162 LBS | BODY MASS INDEX: 29.81 KG/M2 | HEIGHT: 62 IN | DIASTOLIC BLOOD PRESSURE: 62 MMHG

## 2024-11-07 DIAGNOSIS — Z01.419 ENCOUNTER FOR CERVICAL PAP SMEAR WITH PELVIC EXAM: ICD-10-CM

## 2024-11-07 DIAGNOSIS — N89.8 VAGINAL DISCHARGE: Primary | ICD-10-CM

## 2024-11-07 PROCEDURE — 87624 HPV HI-RISK TYP POOLED RSLT: CPT

## 2024-11-07 PROCEDURE — G0101 CA SCREEN;PELVIC/BREAST EXAM: HCPCS | Performed by: INTERNAL MEDICINE

## 2024-11-07 PROCEDURE — 87591 N.GONORRHOEAE DNA AMP PROB: CPT

## 2024-11-07 PROCEDURE — 3008F BODY MASS INDEX DOCD: CPT | Performed by: INTERNAL MEDICINE

## 2024-11-07 PROCEDURE — 82270 OCCULT BLOOD FECES: CPT | Performed by: INTERNAL MEDICINE

## 2024-11-07 PROCEDURE — 88175 CYTOPATH C/V AUTO FLUID REDO: CPT

## 2024-11-07 PROCEDURE — 99213 OFFICE O/P EST LOW 20 MIN: CPT | Performed by: INTERNAL MEDICINE

## 2024-11-07 PROCEDURE — 87491 CHLMYD TRACH DNA AMP PROBE: CPT

## 2024-11-07 NOTE — PROGRESS NOTES
"Subjective   Patient ID: Renetta Andrea is a 49 y.o. female who presents for Hospital Follow-up and Gynecologic Exam.    Patient is here for GYN exam  She was recently treated with Flagyl for BV also treated for yeast  Still having vaginal discharge     Social history: Non-smoker no drugs weekend cocktail, 3 kids youngest 8 years old  Past medical history: Obstructive sleep apnea, anxiety, bipolar, cholecystectomy  Occupation: Retired social service on disability   Review of Systems    Objective   /62   Ht 1.575 m (5' 2\")   Wt 73.5 kg (162 lb)   BMI 29.63 kg/m²     Physical Exam  Vitals reviewed.   Constitutional:       Appearance: Normal appearance.   HENT:      Head: Normocephalic and atraumatic.      Right Ear: Tympanic membrane, ear canal and external ear normal.      Left Ear: Tympanic membrane, ear canal and external ear normal.      Nose: Nose normal.      Mouth/Throat:      Pharynx: Oropharynx is clear.   Eyes:      Extraocular Movements: Extraocular movements intact.      Conjunctiva/sclera: Conjunctivae normal.      Pupils: Pupils are equal, round, and reactive to light.   Cardiovascular:      Rate and Rhythm: Normal rate and regular rhythm.      Pulses: Normal pulses.      Heart sounds: Normal heart sounds.   Pulmonary:      Effort: Pulmonary effort is normal.      Breath sounds: Normal breath sounds.   Chest:      Chest wall: No swelling or tenderness.   Breasts:     Right: Normal. No swelling, mass, nipple discharge, skin change or tenderness.      Left: Normal. No swelling, mass, nipple discharge, skin change or tenderness.   Abdominal:      General: Abdomen is flat. Bowel sounds are normal.      Palpations: Abdomen is soft.      Hernia: There is no hernia in the left inguinal area or right inguinal area.   Genitourinary:     Pubic Area: No rash.       Labia:         Right: No rash, tenderness or lesion.         Left: No rash, tenderness or lesion.       Urethra: No urethral pain, " urethral swelling or urethral lesion.      Vagina: Normal.      Cervix: Normal.      Uterus: Normal.       Adnexa: Right adnexa normal and left adnexa normal.      Rectum: Normal.      Comments: Vaginal discharge  Musculoskeletal:      Cervical back: Normal range of motion and neck supple.   Lymphadenopathy:      Upper Body:      Right upper body: No axillary adenopathy.      Left upper body: No axillary adenopathy.      Lower Body: No right inguinal adenopathy. No left inguinal adenopathy.   Skin:     General: Skin is warm and dry.      Findings: No lesion or rash.   Neurological:      General: No focal deficit present.      Mental Status: She is alert and oriented to person, place, and time.   Psychiatric:         Mood and Affect: Mood normal.         Assessment/Plan   Problem List Items Addressed This Visit    None  Visit Diagnoses       Vaginal discharge    -  Primary    Encounter for cervical Pap smear with pelvic exam        Relevant Orders    THINPREP PAP TEST    HPV High Risk PCR    C. trachomatis / N. gonorrhoeae, Amplified (Completed)        GYN exam normal  Pap test done  Mammogram ordered  GC and Chlamydia ordered

## 2024-11-08 LAB
C TRACH RRNA SPEC QL NAA+PROBE: NEGATIVE
N GONORRHOEA DNA SPEC QL PROBE+SIG AMP: NEGATIVE

## 2024-11-09 ENCOUNTER — HOSPITAL ENCOUNTER (EMERGENCY)
Facility: HOSPITAL | Age: 49
Discharge: HOME | End: 2024-11-09
Payer: COMMERCIAL

## 2024-11-09 VITALS
RESPIRATION RATE: 16 BRPM | OXYGEN SATURATION: 100 % | DIASTOLIC BLOOD PRESSURE: 83 MMHG | SYSTOLIC BLOOD PRESSURE: 124 MMHG | WEIGHT: 161.6 LBS | HEART RATE: 92 BPM | TEMPERATURE: 98.1 F | BODY MASS INDEX: 29.74 KG/M2 | HEIGHT: 62 IN

## 2024-11-09 DIAGNOSIS — G25.5 MUSCLE, JERKY MOVEMENTS (UNCONTROLLED): Primary | ICD-10-CM

## 2024-11-09 LAB
ALBUMIN SERPL BCP-MCNC: 4 G/DL (ref 3.4–5)
ALP SERPL-CCNC: 52 U/L (ref 33–110)
ALT SERPL W P-5'-P-CCNC: 24 U/L (ref 7–45)
ANION GAP SERPL CALCULATED.3IONS-SCNC: 12 MMOL/L (ref 10–20)
AST SERPL W P-5'-P-CCNC: 23 U/L (ref 9–39)
BILIRUB SERPL-MCNC: 0.3 MG/DL (ref 0–1.2)
BUN SERPL-MCNC: 11 MG/DL (ref 6–23)
CALCIUM SERPL-MCNC: 8.7 MG/DL (ref 8.6–10.3)
CHLORIDE SERPL-SCNC: 103 MMOL/L (ref 98–107)
CO2 SERPL-SCNC: 26 MMOL/L (ref 21–32)
CREAT SERPL-MCNC: 0.73 MG/DL (ref 0.5–1.05)
EGFRCR SERPLBLD CKD-EPI 2021: >90 ML/MIN/1.73M*2
GLUCOSE SERPL-MCNC: 109 MG/DL (ref 74–99)
MAGNESIUM SERPL-MCNC: 2.24 MG/DL (ref 1.6–2.4)
POTASSIUM SERPL-SCNC: 3.7 MMOL/L (ref 3.5–5.3)
PROT SERPL-MCNC: 6.8 G/DL (ref 6.4–8.2)
SODIUM SERPL-SCNC: 137 MMOL/L (ref 136–145)

## 2024-11-09 PROCEDURE — 99283 EMERGENCY DEPT VISIT LOW MDM: CPT

## 2024-11-09 PROCEDURE — 36415 COLL VENOUS BLD VENIPUNCTURE: CPT | Performed by: PHYSICIAN ASSISTANT

## 2024-11-09 PROCEDURE — 80053 COMPREHEN METABOLIC PANEL: CPT | Performed by: PHYSICIAN ASSISTANT

## 2024-11-09 PROCEDURE — 83735 ASSAY OF MAGNESIUM: CPT | Performed by: PHYSICIAN ASSISTANT

## 2024-11-09 RX ORDER — METHOCARBAMOL 500 MG/1
500 TABLET, FILM COATED ORAL 2 TIMES DAILY PRN
Qty: 20 TABLET | Refills: 0 | Status: SHIPPED | OUTPATIENT
Start: 2024-11-09

## 2024-11-09 ASSESSMENT — COLUMBIA-SUICIDE SEVERITY RATING SCALE - C-SSRS
6. HAVE YOU EVER DONE ANYTHING, STARTED TO DO ANYTHING, OR PREPARED TO DO ANYTHING TO END YOUR LIFE?: NO
1. IN THE PAST MONTH, HAVE YOU WISHED YOU WERE DEAD OR WISHED YOU COULD GO TO SLEEP AND NOT WAKE UP?: NO
2. HAVE YOU ACTUALLY HAD ANY THOUGHTS OF KILLING YOURSELF?: NO

## 2024-11-09 ASSESSMENT — PAIN SCALES - GENERAL: PAINLEVEL_OUTOF10: 0 - NO PAIN

## 2024-11-09 ASSESSMENT — PAIN - FUNCTIONAL ASSESSMENT: PAIN_FUNCTIONAL_ASSESSMENT: 0-10

## 2024-11-09 NOTE — ED PROVIDER NOTES
"HPI   Chief Complaint   Patient presents with    Spasms     Pt has had muscle spasms or \"jerks\" in her neck for the past couple years. Pt has been seen for this and was referred to therapy. Pt states the \"jerks\" are getting worse.       This is a 49-year-old female with a past medical history of bipolar disorder, depression and anxiety currently on lamotrigine 150 mg and Lexapro 10 mg oral presenting to the emergency department with concerns of ongoing tics x 2 years.  Patient denies muscle pains today.  She states that she has noticed in the last several months the tics have worsened.  She does have an appoint with neurology in the beginning of December.  She is coming in today to see if anything else can be done.  She states she feels like no one is taking her seriously and she would like to see if that anything else she can do for the tics.  She was recently taken off her Abilify 3 weeks ago and transitioned from lamotrigine 100 mg daily 150 mg daily.        Please see HPI for pertinent positive and negative ROS.       Patient History   No past medical history on file.  No past surgical history on file.  Family History   Problem Relation Name Age of Onset    Diabetes Mother      Hypertension Mother       Social History     Tobacco Use    Smoking status: Never    Smokeless tobacco: Never   Substance Use Topics    Alcohol use: Not on file    Drug use: Not on file       Physical Exam   ED Triage Vitals [11/09/24 1336]   Temperature Heart Rate Respirations BP   36.7 °C (98.1 °F) 92 16 124/83      Pulse Ox Temp Source Heart Rate Source Patient Position   100 % Temporal Monitor Sitting      BP Location FiO2 (%)     Right arm --       Physical Exam  GENERAL APPEARANCE: Awake and alert. No acute respiratory distress.   VITAL SIGNS: As per the nurses' triage record.  HEENT: Normocephalic, atraumatic. Extraocular muscles are intact. Conjunctiva are pink. Negative scleral icterus. Mucous membranes are moist. Tongue in the " midline. Oropharynx clear, uvula midline.   NECK: Soft, nontender and supple, full gross ROM, no meningeal signs.  CHEST: Nontender to palpation. Clear to auscultation bilaterally. No rales, rhonchi, or wheezing. Symmetric rise and fall of chest wall.   HEART: Clear S1 and S2. Regular rate and rhythm. No murmurs appreciated on auscultation.  Strong and equal pulses in the extremities.  ABDOMEN: Soft, nontender, nondistended, positive bowel sounds, no palpable masses.  MUSCULOSKELETAL: Full gross active range of motion. Ambulating on own with no acute difficulties  NEUROLOGICAL: Awake, alert and oriented x 3. Motor power intact in the upper and lower extremities. Sensation is intact to light touch in the upper and lower extremities. Patient answering questions appropriately.   IMMUNOLOGICAL: No lymphatic streaking noted  DERMATOLOGIC: Warm and dry without petechiae, rashes, or ecchymosis noted on visible skin.   PYSCH: Cooperative with appropriate mood and affect.    ED Course & MDM   Diagnoses as of 11/10/24 1316   Muscle, jerky movements (uncontrolled)                 No data recorded     Vahid Coma Scale Score: 15 (11/09/24 1357 : Denisse Mcgill RN)                           Medical Decision Making  Parts of this chart have been completed using voice recognition software. Please excuse any errors of transcription.  My thought process and reason for plan has been formulated from the time that I saw the patient until the time of disposition and is not specific to one specific moment during their visit and furthermore my MDM encompasses this entire chart and not only this text box.      HPI: Detailed above.    Exam: A medically appropriate exam performed, outlined above, given the known history and presentation.    History obtained from: Patient    Medications given during visit:  Medications - No data to display     Diagnostic/tests  Labs Reviewed   COMPREHENSIVE METABOLIC PANEL - Abnormal       Result Value     Glucose 109 (*)     Sodium 137      Potassium 3.7      Chloride 103      Bicarbonate 26      Anion Gap 12      Urea Nitrogen 11      Creatinine 0.73      eGFR >90      Calcium 8.7      Albumin 4.0      Alkaline Phosphatase 52      Total Protein 6.8      AST 23      Bilirubin, Total 0.3      ALT 24     MAGNESIUM - Normal    Magnesium 2.24        No orders to display        Considerations/further MDM:    This is a 49-year-old female presenting to the emergency department with concerns of tics x 2 years.  I did check patient's electrolytes and there is no electrolyte disturbance.  She did not have any tics in the emergency department.  I did encourage patient that she should proceed with physical therapy that was set up for the tics.  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  I did encourage patient she should continue physical therapy.  I also put in a referral to neurology.  I did give patient a prescription for methocarbamol to see if this will help her tightness in her neck when she experienced the tics at night.  Patient felt comfortable with this discharge plan home.  She was discharged in stable condition.      Procedure  Procedures     Gricelda Flor PA-C  11/10/24 9488

## 2024-11-09 NOTE — DISCHARGE INSTRUCTIONS
I do recommend you continue physical therapy sessions as I do feel these will help greatly with your movement disorder.  If you begin to have a tightness or pain in your neck I did prescribe you a muscle relaxer that you can try.  I did refer you to neurology.  I recommend you follow-up with neurology in regards to continuing your physical therapy sessions.  If you have any further concerns you may return to the emergency department anytime for reevaluation.

## 2024-11-18 ENCOUNTER — APPOINTMENT (OUTPATIENT)
Dept: SLEEP MEDICINE | Facility: CLINIC | Age: 49
End: 2024-11-18
Payer: COMMERCIAL

## 2024-11-19 LAB
CYTOLOGY CMNT CVX/VAG CYTO-IMP: NORMAL
HPV HR GENOTYPES PNL CVX NAA+PROBE: NEGATIVE
LAB AP HPV GENOTYPE QUESTION: NO
LAB AP HPV HR: NORMAL
LAB AP PAP ADDITIONAL TESTS: NORMAL
LABORATORY COMMENT REPORT: NORMAL
PATH REPORT.TOTAL CANCER: NORMAL

## 2024-11-20 ENCOUNTER — LAB REQUISITION (OUTPATIENT)
Dept: LAB | Facility: HOSPITAL | Age: 49
End: 2024-11-20
Payer: COMMERCIAL

## 2024-11-20 PROCEDURE — 88302 TISSUE EXAM BY PATHOLOGIST: CPT

## 2024-11-20 PROCEDURE — 88302 TISSUE EXAM BY PATHOLOGIST: CPT | Performed by: PATHOLOGY

## 2024-11-20 PROCEDURE — 0751T DGTZ GLS MCRSCP SLD LEVEL II: CPT

## 2024-12-03 ENCOUNTER — APPOINTMENT (OUTPATIENT)
Dept: NEUROLOGY | Facility: CLINIC | Age: 49
End: 2024-12-03
Payer: COMMERCIAL

## 2024-12-03 VITALS
BODY MASS INDEX: 29.44 KG/M2 | HEART RATE: 71 BPM | SYSTOLIC BLOOD PRESSURE: 102 MMHG | HEIGHT: 62 IN | DIASTOLIC BLOOD PRESSURE: 64 MMHG | WEIGHT: 160 LBS

## 2024-12-03 DIAGNOSIS — F95.8 MOTOR TIC DISORDER: Primary | ICD-10-CM

## 2024-12-03 PROCEDURE — 3008F BODY MASS INDEX DOCD: CPT | Performed by: PSYCHIATRY & NEUROLOGY

## 2024-12-03 PROCEDURE — 1036F TOBACCO NON-USER: CPT | Performed by: PSYCHIATRY & NEUROLOGY

## 2024-12-03 PROCEDURE — 99213 OFFICE O/P EST LOW 20 MIN: CPT | Performed by: PSYCHIATRY & NEUROLOGY

## 2024-12-03 RX ORDER — ESCITALOPRAM OXALATE 10 MG/1
10 TABLET ORAL DAILY
COMMUNITY

## 2024-12-03 NOTE — PROGRESS NOTES
Subjective   Renetta Andrea is a 49 y.o. right-handed female.  HPI  This is a 49 year old woman here for follow up of psychogenic/myoclonic type muscle jerking, onset started 3 years ago, previously seen by me in 5/2023, has already tried lamotrigine and benztropine- not been helpful.  She has seen Dr. De La Torre, Movement Disorders specialist in 9/24: patient has a history of PTSD, CAN, anxiety, predominately neck muscle jerks.  She was on Abilify for a few years, SSRI medications.  She feels that her anxiety and depression are under control.  She denies any active psychiatric problems.  She has been on Xanax in the past- not now.  She is on low dose Lexapro (10 mg) daily and hydroxyzine 25 mg- not taking.  In the last two Neurology visits she was recommended to have PT- she has declined this.  She has been checked for Tourette's- patient denies any childhood history of Tourette's.  Objective   Neurological Exam  On exam- no orobuccal movements.  She had two motor tics involving the neck.  Physical Exam  I personally reviewed laboratory, radiographic, and medical studies which were pertinent for nothing.    Assessment/Plan

## 2024-12-03 NOTE — PATIENT INSTRUCTIONS
It appears you have motor tics involving the neck muscles primarily, sometimes the shoulders.  I reviewed possible treatments for this condition, such as Botox injections in the neck muscles.  The cause of this movement disorder is not not known to me, but I agree with your seeing Dr. Jennings in the next month, and ask for her recommendations.  Thank you for coming in today.

## 2024-12-04 LAB
LABORATORY COMMENT REPORT: NORMAL
PATH REPORT.FINAL DX SPEC: NORMAL
PATH REPORT.GROSS SPEC: NORMAL
PATH REPORT.TOTAL CANCER: NORMAL

## 2024-12-11 ENCOUNTER — APPOINTMENT (OUTPATIENT)
Dept: ALLERGY | Facility: HOSPITAL | Age: 49
End: 2024-12-11
Payer: COMMERCIAL

## 2025-01-23 ENCOUNTER — APPOINTMENT (OUTPATIENT)
Facility: CLINIC | Age: 50
End: 2025-01-23
Payer: COMMERCIAL

## 2025-01-23 VITALS
HEIGHT: 62 IN | WEIGHT: 165 LBS | BODY MASS INDEX: 30.36 KG/M2 | DIASTOLIC BLOOD PRESSURE: 90 MMHG | SYSTOLIC BLOOD PRESSURE: 144 MMHG | HEART RATE: 71 BPM | TEMPERATURE: 96.6 F

## 2025-01-23 DIAGNOSIS — G25.5 MUSCLE, JERKY MOVEMENTS (UNCONTROLLED): ICD-10-CM

## 2025-01-23 DIAGNOSIS — G25.9 MOVEMENT DISORDER: ICD-10-CM

## 2025-01-23 RX ORDER — ERGOCALCIFEROL 1.25 MG/1
1.25 CAPSULE ORAL
Qty: 4 CAPSULE | Refills: 11 | Status: SHIPPED | OUTPATIENT
Start: 2025-01-26 | End: 2026-01-26

## 2025-01-23 ASSESSMENT — ENCOUNTER SYMPTOMS
LOSS OF SENSATION IN FEET: 0
DEPRESSION: 0
OCCASIONAL FEELINGS OF UNSTEADINESS: 0

## 2025-01-23 ASSESSMENT — PAIN SCALES - GENERAL: PAINLEVEL_OUTOF10: 3

## 2025-01-23 NOTE — PATIENT INSTRUCTIONS
"It was a pleasure seeing you today.     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  This is a brain connection problem.  There is no specific treatment for this.  There is no test for this.  We diagnose this based on symptoms and exam.   This can 100% go away which is an excellent prognosis.   Treatment focus is on treating underlying mood issues such as depression/ anxiety/ sleep and pain issues.    I recommend you see psychology and psychiatry to help evaluate and treat underlying issues.    Sometimes rehabilitation can be helpful (\"motor retraining\")   The medical community is gaining new insight into these disorders.     There are online support groups for functional neurologic disorders : fndhope.org   and https://www.fndsociety.org/     I want you to get a MRI Brain- Please call radiology to schedule at [phone].   If the results are abnormal, I will call you to discuss.    Please make a follow up appointment in 5-6 months.  You may also schedule a phone or virtual visit sooner on a Friday morning with me as needed before the next clinic appointment.     For any urgent issues or needing to speak to a medical assistant please call [phone], option 6 during our office hours Monday-Friday 8am-4pm, and leave a voicemail with your concern.  My office will try to reach back you as soon as possible within 24 (business) hours.  If you have an emergency please call [phone] or visit a local urgent care or nearest emergency room.      Please understand that [employer] is a useful communication tool for simple \"normal\" results or a refill request but I would not recommend using this tool for emergent or urgent issues or for conversations with me.  I am happy to ask my staff to rearrange a follow up visit or a virtual visit sooner than requested if appropriate for your care.    "

## 2025-01-23 NOTE — PROGRESS NOTES
Consultation:   Subjective      Renetta Andrea is a 49 y.o. year old female is here for consult for jerking movements.    Referred by Risa Kitchen MD  Accompanied by her mother, who is an independent historian.     HPI  She had sudden brief movement of her head and neck that will sudden brief, might affecting either shoulder.  No urge . No speech issues.   When talking and moving around she does not have them.  When sitting, relaxed and watching a movie she may have this.  She is a care coordinator .  Does not interrupt work.   Does not affect her driving.   CT head - unremarkable.  She has had anxiety/ depression but has been stable.  Lexapro has been helping her anxiety.   She was not sleeping well for many years but that has improved.   She cannot control the movements at all.  Occasional will get neck pain or headache.   No major head injuries.    Review of Systems    Patient Active Problem List   Diagnosis    Age-related nuclear cataract of both eyes    Anemia    Antibiotic-induced yeast infection    Anxiety    Astigmatism of both eyes    Bariatric surgery status    Bilateral myopia    Bursitis of knee    Conjunctivitis, acute    Fatigue    Hyperlipidemia    Insomnia    Leg cramps    Low blood sugar    Myoclonus    BMI 30.0-30.9,adult    Obstructive sleep apnea of adult    Vitamin D deficiency    Class 1 obesity with body mass index (BMI) of 32.0 to 32.9 in adult    Preventative health care    Allergic rhinitis    Bipolar disorder, currently in remission, most recent episode unspecified (Multi)    Bulimia nervosa    Calcaneal spur    Abnormal cells of cervix    Dysplasia of cervix (uteri)    Heel pain    Heel pain, bilateral    History of attention deficit hyperactivity disorder (ADHD)    Itching    Major depressive disorder    Recurrent major depressive disorder (CMS-HCC)    Nuclear senile cataract    Panic disorder without agoraphobia    Bursitis    Plantar fascial fibromatosis    Plantar fasciitis     Post traumatic stress disorder    Presence of intrauterine contraceptive device (IUD)    Cryptomerorachischisis    Spina bifida occulta    Uterine leiomyoma    Insomnia due to other mental disorder    Mood disorder (CMS-HCC)    Acute bilateral otitis media    Acute pain of left knee    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  No past surgical history on file.  Social History     Tobacco Use    Smoking status: Never    Smokeless tobacco: Never   Substance Use Topics    Alcohol use: Yes     Comment: social     family history includes Diabetes in her mother; Hypertension in her mother.    Current Outpatient Medications:     escitalopram (Lexapro) 10 mg tablet, Take 1 tablet (10 mg) by mouth once daily., Disp: , Rfl:     lamoTRIgine (LaMICtal) 100 mg tablet, Take 1 tablet (100 mg) by mouth once daily. (Patient taking differently: Take 1 tablet (100 mg) by mouth once daily. Pt taking 150 MG daily.), Disp: , Rfl:   No Known Allergies  There were no vitals taken for this visit.  Neurological Exam/Physical Exam:    Constitutional: General appearance: no acute distress. Pleasant.   Auscultation of Heart: Regular rate and rhythm, no murmurs, normal S1 and S2.   Carotid Arteries: no bruits  Peripheral Vascular Exam: No swelling in extremities  Mental status: no distress, alert, interactive and cooperative. Affect is appropriate.   Memory: recent memory intact   Attention: normal attention  Language: normal comprehension   Fund of knowledge: Patient displays adequate knowledge of current events.  Eyes: The ophthalmoscopic examination was normal.   Cranial nerve II: Visual fields full to confrontation.   Cranial nerves III, IV, and VI: Pupils round, equally reactive to light; EOMs intact. No nystagmus.   Cranial Nerve V: Facial  sensation intact to LT bilaterally.   Cranial nerve VII: no facial droop  Cranial nerve VIII: Hearing is intact  Cranial nerves IX and X: Palate elevates symmetrically.   Cranial nerve XI: Shoulder shrug intact.   Cranial nerve XII: Tongue is midline.  Motor:   Occasional neck twitching and shoulder movements, very brief , occurs in cluster at times. Distractable. Happens with resting/ talking at times.   Deep Tendon Reflexes: left biceps 2+ , right biceps 2+, left triceps 2+, right triceps 2+, left brachioradialis 2+, right brachioradialis 2+, left patella 2+, right patella 2+, left ankle jerk 2+, right ankle jerk 2+   Plantar Reflex: Toes downgoing to plantar stimulation on the left. Toes downgoing to plantar stimulation on the right.   Sensory Exam: Normal to vibratory sensation  Coordination:  no limb dystaxia   Gait:  normal       Labs:  CBC:   Lab Results   Component Value Date    WBC 4.3 (L) 07/08/2024    HGB 12.8 07/08/2024    HCT 38.2 07/08/2024     07/08/2024     BMP:   Lab Results   Component Value Date     11/09/2024    K 3.7 11/09/2024     11/09/2024    CO2 26 11/09/2024    BUN 11 11/09/2024    CREATININE 0.73 11/09/2024    CALCIUM 8.7 11/09/2024    MG 2.24 11/09/2024     LFT:   Lab Results   Component Value Date    ALKPHOS 52 11/09/2024    BILITOT 0.3 11/09/2024    PROT 6.8 11/09/2024    ALBUMIN 4.0 11/09/2024    ALT 24 11/09/2024    AST 23 11/09/2024       Labs were viewed personally.  Nov 2024   Kidney function normal.  CBC shows no signs of anemia.  Electrolytes normal.  B12 normal   LFTS are also unremarkable.   .  TSH 0.38 (2/23)    Assessment/Plan   Problem List Items Addressed This Visit             ICD-10-CM    Muscle, jerky movements (uncontrolled) G25.5

## 2025-02-05 ENCOUNTER — APPOINTMENT (OUTPATIENT)
Dept: NEUROLOGY | Facility: HOSPITAL | Age: 50
End: 2025-02-05
Payer: COMMERCIAL

## 2025-02-10 ENCOUNTER — APPOINTMENT (OUTPATIENT)
Dept: OTOLARYNGOLOGY | Facility: CLINIC | Age: 50
End: 2025-02-10
Payer: COMMERCIAL

## 2025-02-24 ENCOUNTER — APPOINTMENT (OUTPATIENT)
Dept: PRIMARY CARE | Facility: CLINIC | Age: 50
End: 2025-02-24
Payer: COMMERCIAL

## 2025-02-27 ENCOUNTER — HOSPITAL ENCOUNTER (OUTPATIENT)
Dept: RADIOLOGY | Facility: CLINIC | Age: 50
Discharge: HOME | End: 2025-02-27
Payer: COMMERCIAL

## 2025-02-27 DIAGNOSIS — G25.5 MUSCLE, JERKY MOVEMENTS (UNCONTROLLED): ICD-10-CM

## 2025-02-27 PROCEDURE — 70551 MRI BRAIN STEM W/O DYE: CPT

## 2025-03-04 ENCOUNTER — TELEPHONE (OUTPATIENT)
Dept: NEUROLOGY | Facility: CLINIC | Age: 50
End: 2025-03-04
Payer: COMMERCIAL

## 2025-03-04 NOTE — TELEPHONE ENCOUNTER
Patient was inquiring if you would be able to place a order for a neck brace as she is in pain due to her neurological disorder. Please advise

## 2025-03-06 DIAGNOSIS — M54.2 NECK PAIN: Primary | ICD-10-CM

## 2025-04-01 ENCOUNTER — APPOINTMENT (OUTPATIENT)
Dept: OBSTETRICS AND GYNECOLOGY | Facility: CLINIC | Age: 50
End: 2025-04-01
Payer: COMMERCIAL

## 2025-04-01 VITALS
BODY MASS INDEX: 30.36 KG/M2 | DIASTOLIC BLOOD PRESSURE: 80 MMHG | WEIGHT: 165 LBS | HEIGHT: 62 IN | SYSTOLIC BLOOD PRESSURE: 120 MMHG

## 2025-04-01 DIAGNOSIS — Z30.430 ENCOUNTER FOR IUD INSERTION: ICD-10-CM

## 2025-04-01 LAB
POC BLOOD, URINE: NEGATIVE
POC GLUCOSE, URINE: NEGATIVE MG/DL
POC LEUKOCYTES, URINE: NEGATIVE
POC NITRITE,URINE: NEGATIVE
POC PROTEIN, URINE: NEGATIVE MG/DL
PREGNANCY TEST URINE, POC: NEGATIVE

## 2025-04-01 PROCEDURE — 81025 URINE PREGNANCY TEST: CPT | Performed by: OBSTETRICS & GYNECOLOGY

## 2025-04-01 PROCEDURE — 58300 INSERT INTRAUTERINE DEVICE: CPT | Performed by: OBSTETRICS & GYNECOLOGY

## 2025-04-01 PROCEDURE — 58301 REMOVE INTRAUTERINE DEVICE: CPT | Performed by: OBSTETRICS & GYNECOLOGY

## 2025-04-01 PROCEDURE — 81002 URINALYSIS NONAUTO W/O SCOPE: CPT | Performed by: OBSTETRICS & GYNECOLOGY

## 2025-04-01 NOTE — PROGRESS NOTES
Patient ID: Renetta Andrea is a 49 y.o. female.    IUD Insertion    Performed by: Lars Celestin MD  Authorized by: Lars Celestin MD    Procedure: IUD removal and insertion    Consent obtained by patient, parent, or legal power of  - including discussion of procedure risks and benefits, patient questions answered, and patient education provided: yes    Reason for removal: patient request    Strings visualized: yes    Tenaculum applied to cervix: yes    IUD grasped by forceps: yes    Performed with ultrasound guidance: no    IUD removed: yes    Date/Time of Removal:  4/1/2025 2:31 PM  Removed without complications: yes    IUD intact: yes    Pregnancy risk: reasonably certain the patient is not pregnant    Date/Time of Insertion:  4/1/2025 2:31 PM  Immediately prior to procedure a time out was called: yes    Pelvic exam performed: yes    Speculum placed in vagina: yes    Cervix cleaned and prepped: yes    Tenaculum/Allis/Ring Forceps applied to cervix: yes    Anesthesia used: no    Uterus sound depth (cm):  7  OSM: levonorgestrel 20 mcg/24hr  Patient tolerated procedure well: yes    Inserted with ultrasound guidance: no    Transvaginal sono confirmed fundal placement: no    Intended removal date: 8 years    Uterus anteverted slightly anteflexed

## 2025-05-15 DIAGNOSIS — G25.5 MUSCLE, JERKY MOVEMENTS (UNCONTROLLED): ICD-10-CM

## 2025-05-16 RX ORDER — ERGOCALCIFEROL 1.25 MG/1
1.25 CAPSULE ORAL
Qty: 4 CAPSULE | Refills: 11 | Status: SHIPPED | OUTPATIENT
Start: 2025-05-18 | End: 2026-05-18

## 2025-05-21 ENCOUNTER — APPOINTMENT (OUTPATIENT)
Dept: OBSTETRICS AND GYNECOLOGY | Facility: CLINIC | Age: 50
End: 2025-05-21
Payer: COMMERCIAL

## 2025-05-21 ENCOUNTER — OFFICE VISIT (OUTPATIENT)
Dept: OBSTETRICS AND GYNECOLOGY | Facility: CLINIC | Age: 50
End: 2025-05-21
Payer: COMMERCIAL

## 2025-05-21 VITALS
WEIGHT: 165 LBS | HEIGHT: 62 IN | SYSTOLIC BLOOD PRESSURE: 118 MMHG | BODY MASS INDEX: 30.36 KG/M2 | DIASTOLIC BLOOD PRESSURE: 80 MMHG

## 2025-05-21 DIAGNOSIS — N93.9 VAGINAL SPOTTING: ICD-10-CM

## 2025-05-21 DIAGNOSIS — Z30.431 IUD CHECK UP: Primary | ICD-10-CM

## 2025-05-21 PROCEDURE — 76830 TRANSVAGINAL US NON-OB: CPT | Performed by: OBSTETRICS & GYNECOLOGY

## 2025-05-21 PROCEDURE — 99213 OFFICE O/P EST LOW 20 MIN: CPT | Performed by: OBSTETRICS & GYNECOLOGY

## 2025-05-21 PROCEDURE — 3008F BODY MASS INDEX DOCD: CPT | Performed by: OBSTETRICS & GYNECOLOGY

## 2025-05-21 RX ORDER — LEVONORGESTREL 52 MG/1
1 INTRAUTERINE DEVICE INTRAUTERINE ONCE
COMMUNITY

## 2025-05-21 NOTE — PROGRESS NOTES
Subjective   Patient ID: Renetta Andrea is a 49 y.o. female who presents for iud follow up.  Follow-up after Mirena removal and replacement.  Some occasional spotting no fever chills or pain abdominal pelvic exam along with ultrasound shows proper placement.  Good for 8 years.  Good till 2033.  Reviewed the expected reduction in bleeding.  Follow-up for annual exam Pap and mammogram    IUD checkup, vaginal spotting        Review of Systems   Genitourinary:  Positive for vaginal bleeding.   All other systems reviewed and are negative.      Objective   Physical Exam  Constitutional:       Appearance: Normal appearance. She is normal weight.   Genitourinary:     General: Normal vulva.      Vagina: Normal.      Cervix: Normal.      Uterus: Normal.       Adnexa: Right adnexa normal and left adnexa normal.      Comments: Ultrasound shows IUD central and properly positioned  Neurological:      Mental Status: She is alert.         Assessment/Plan   IUD checkup occasional vaginal spotting ultrasound shows proper placement         Lars Celestin MD 05/21/25 2:00 PM

## 2025-07-28 ENCOUNTER — APPOINTMENT (OUTPATIENT)
Dept: PRIMARY CARE | Facility: CLINIC | Age: 50
End: 2025-07-28
Payer: COMMERCIAL

## 2025-07-30 ENCOUNTER — APPOINTMENT (OUTPATIENT)
Dept: PRIMARY CARE | Facility: CLINIC | Age: 50
End: 2025-07-30
Payer: COMMERCIAL

## 2025-07-30 VITALS
HEIGHT: 62 IN | DIASTOLIC BLOOD PRESSURE: 82 MMHG | SYSTOLIC BLOOD PRESSURE: 120 MMHG | BODY MASS INDEX: 30.24 KG/M2 | WEIGHT: 164.3 LBS

## 2025-07-30 DIAGNOSIS — E78.2 MIXED HYPERLIPIDEMIA: ICD-10-CM

## 2025-07-30 DIAGNOSIS — R79.89 ABNORMAL TSH: ICD-10-CM

## 2025-07-30 DIAGNOSIS — T78.40XS ALLERGY, SEQUELA: ICD-10-CM

## 2025-07-30 PROCEDURE — 99214 OFFICE O/P EST MOD 30 MIN: CPT | Performed by: INTERNAL MEDICINE

## 2025-07-30 PROCEDURE — 3008F BODY MASS INDEX DOCD: CPT | Performed by: INTERNAL MEDICINE

## 2025-07-30 PROCEDURE — 1036F TOBACCO NON-USER: CPT | Performed by: INTERNAL MEDICINE

## 2025-07-30 NOTE — PROGRESS NOTES
Subjective   Patient ID: Renetta Andrea is a 50 y.o. female who presents for Follow-up ([employer] Plasma donation form  ).    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 to have jerking movement  Follow-up on blood work       Past recap    patient is here for follow-up  Was on Adipex for losing weight  Losing weight is a struggle  Having involuntary motion in the neck feeling woozy neck sore  Saw the neurologist recommended therapy  For anxiety on Abilify and Lexapro and working    Patient is here with complaints of having allergies having drippy nose Claritin is not helping symptoms going on for past 3 months  Also wants to discuss her last blood work    Past recap  Patient is here for follow-up on Ozempic  She has lost 5/ 6 pounds on Ozempic  She is tolerating well  She is interested in increasing the dose  Yesterday she was started on Lamictal by the psychiatrist     PAST RECAP   patient is here for physical  High cholesterol needs medication refill  Anxiety is doing fine   patient wants some diet will to help control her weight he had gastric bypass surgery done  She has not done her routine blood for a while     Past recap  Patient is here for follow-up  Follow-up on blood work  Still feels very tired  Has IUD so not sure if she is going through menopause      patient had bariatric surgery done April 26  She has lost from 197 pounds to 179 pounds  She was doing great but recently she is feeling very fatigued  She is denying any abdominal pain  Needs refills on medication for cholesterol        Patient presents with complaints of having both knee pain  Right knee hurts for a while but now left knee started hurting.  She started hurting after recently going for exercise classes where there was lot of bending and moving  Follow-up on blood work  Having problems with insomnia trazodone is not helping  She does  "eat a lot of fast food     past recap  Patient presents with complaints of having jerking movements  Over the body jerks leg jerks arms legs head jerks  She is concerned she is having seizure  She had these movements going on off and on but more pronounced recently. No correlation with any activity  She is on Paxil for more than 20 years and takes hydroxyzine for anxiety  She is going for stress test through cardiology again  Legs also cramping at night     She is going to the gastric bypass program and working with the dietitian to lose weight        To establish PCP  Moved from [place] to [place]  She is on disability for anxiety and bipolar  She is going to establish with a new psychiatrist  She wants to go for gastric bypass surgery because she stated else okay able to lose her weight     Social history: Non-smoker no drugs weekend cocktail, 3 kids youngest 8 years old  Past medical history: Obstructive sleep apnea, anxiety, bipolar, cholecystectomy  Occupation: Retired social service on disability   Review of Systems    Objective   /82   Ht 1.575 m (5' 2\")   Wt 74.5 kg (164 lb 4.8 oz)   BMI 30.05 kg/m²     Physical Exam  Vitals reviewed.   Constitutional:       Appearance: Normal appearance.   HENT:      Head: Normocephalic and atraumatic.      Right Ear: Tympanic membrane, ear canal and external ear normal.      Left Ear: Tympanic membrane, ear canal and external ear normal.      Nose: Nose normal.      Mouth/Throat:      Pharynx: Oropharynx is clear.     Eyes:      Extraocular Movements: Extraocular movements intact.      Conjunctiva/sclera: Conjunctivae normal.      Pupils: Pupils are equal, round, and reactive to light.       Cardiovascular:      Rate and Rhythm: Normal rate and regular rhythm.      Pulses: Normal pulses.      Heart sounds: Normal heart sounds.   Pulmonary:      Effort: Pulmonary effort is normal.      Breath sounds: Normal breath sounds.   Abdominal:      General: Abdomen is " flat. Bowel sounds are normal.      Palpations: Abdomen is soft.     Musculoskeletal:      Cervical back: Normal range of motion and neck supple.     Skin:     General: Skin is warm and dry.     Neurological:      General: No focal deficit present.      Mental Status: She is alert and oriented to person, place, and time.     Psychiatric:         Mood and Affect: Mood normal.         Assessment/Plan   Problem List Items Addressed This Visit          Cardiac and Vasculature    Hyperlipidemia    Relevant Orders    Comprehensive Metabolic Panel    Lipid Panel     Other Visit Diagnoses         Allergy, sequela        Relevant Orders    Referral to Allergy      Abnormal TSH        Relevant Orders    Thyroid Stimulating Hormone                 11/18  Last blood work showed elevated blood sugar  Advised patient to check blood work to rule out any electrolyte imbalance causing the jerking  Other possibility could be clonus activity related to SSRI use for long-term  Check blood work and refer patient to neurology  Blood work ordered for cholesterol and high sugar  Discussed sleep hygiene  Advised to try melatonin if trazodone is not working  Again discussed diet exercise losing weight  Follow-up after blood work           3/15/23  Physical  Routine blood work ordered encourage patient to go for weight loss clinic  Discussed diet and exercise and she has achieved her goal weight she is still insistent on taking Ozempic  Prescription for Ozempic given  Refill given on Lipitor     4/12/23  Will increase dose of Ozempic  Advised patient to follow-up in 1 month for GYN exam  Discussed diet and exercise  Follow-up in a month     16 2023  KOH prep done  We will call with the results  Urine for GC and chlamydia ordered    4/2/2024  Patient has inferior turbinate hypertrophy  Will check for allergies  Use Flonase nasal spray twice a day  Use Claritin D  If not better will try prednisone  Allergy test to help decide if patient will be  candidate for allergy shots  Leukopenia which could be from the Lamictal  Check CBC    9/30/2024  Blood work reviewed  LDL gone up to 131  TSH suppressed at 0.38.  Usually it is in range but this is the first time it is slightly of the range  Will monitor for now  Leukopenia mild stable probably related to Lamictal  Allergy test was positive for dust mite  Continue allergy nasal spray and Claritin  Repeat labs in 3 to 6 months    7/30/2025  Plasma [employer] paperwork reviewed  Started to fill the forms but it is asking about all her psychiatric conditions, psychiatric history, notes related to that  Explained that she needs to get this form filled by her psychiatrist  Patient's allergies are still bothering her refer to allergist  Blood work ordered for abnormal thyroid and cholesterol  Follow-up after blood work

## 2025-08-27 ENCOUNTER — OFFICE VISIT (OUTPATIENT)
Dept: PRIMARY CARE | Facility: CLINIC | Age: 50
End: 2025-08-27
Payer: COMMERCIAL

## 2025-08-27 ENCOUNTER — APPOINTMENT (OUTPATIENT)
Dept: PRIMARY CARE | Facility: CLINIC | Age: 50
End: 2025-08-27
Payer: COMMERCIAL

## 2025-08-27 VITALS
HEIGHT: 62 IN | WEIGHT: 160 LBS | BODY MASS INDEX: 29.44 KG/M2 | DIASTOLIC BLOOD PRESSURE: 70 MMHG | SYSTOLIC BLOOD PRESSURE: 122 MMHG

## 2025-08-27 DIAGNOSIS — G47.33 OBSTRUCTIVE SLEEP APNEA OF ADULT: Primary | ICD-10-CM

## 2025-08-27 PROCEDURE — 1036F TOBACCO NON-USER: CPT | Performed by: INTERNAL MEDICINE

## 2025-08-27 PROCEDURE — 99213 OFFICE O/P EST LOW 20 MIN: CPT | Performed by: INTERNAL MEDICINE

## 2025-08-27 PROCEDURE — 3008F BODY MASS INDEX DOCD: CPT | Performed by: INTERNAL MEDICINE

## 2025-11-13 ENCOUNTER — APPOINTMENT (OUTPATIENT)
Dept: OBSTETRICS AND GYNECOLOGY | Facility: CLINIC | Age: 50
End: 2025-11-13
Payer: COMMERCIAL

## 2026-01-12 ENCOUNTER — APPOINTMENT (OUTPATIENT)
Dept: PRIMARY CARE | Facility: CLINIC | Age: 51
End: 2026-01-12
Payer: COMMERCIAL

## 2026-02-05 ENCOUNTER — APPOINTMENT (OUTPATIENT)
Dept: ALLERGY | Facility: CLINIC | Age: 51
End: 2026-02-05
Payer: COMMERCIAL